# Patient Record
Sex: MALE | Race: WHITE | Employment: UNEMPLOYED | URBAN - METROPOLITAN AREA
[De-identification: names, ages, dates, MRNs, and addresses within clinical notes are randomized per-mention and may not be internally consistent; named-entity substitution may affect disease eponyms.]

---

## 2018-09-17 ENCOUNTER — OP HISTORICAL/CONVERTED ENCOUNTER (OUTPATIENT)
Dept: OTHER | Age: 58
End: 2018-09-17

## 2019-02-04 ENCOUNTER — OP HISTORICAL/CONVERTED ENCOUNTER (OUTPATIENT)
Dept: OTHER | Age: 59
End: 2019-02-04

## 2019-02-04 ENCOUNTER — ED HISTORICAL/CONVERTED ENCOUNTER (OUTPATIENT)
Dept: OTHER | Age: 59
End: 2019-02-04

## 2019-06-12 ENCOUNTER — OP HISTORICAL/CONVERTED ENCOUNTER (OUTPATIENT)
Dept: OTHER | Age: 59
End: 2019-06-12

## 2019-10-08 ENCOUNTER — OP HISTORICAL/CONVERTED ENCOUNTER (OUTPATIENT)
Dept: OTHER | Age: 59
End: 2019-10-08

## 2022-08-31 ENCOUNTER — TRANSCRIBE ORDER (OUTPATIENT)
Dept: SCHEDULING | Age: 62
End: 2022-08-31

## 2022-08-31 DIAGNOSIS — R91.8 LUNG NODULES: Primary | ICD-10-CM

## 2022-09-02 ENCOUNTER — TRANSCRIBE ORDER (OUTPATIENT)
Dept: REGISTRATION | Age: 62
End: 2022-09-02

## 2022-09-02 DIAGNOSIS — R91.8 LUNG NODULES: Primary | ICD-10-CM

## 2022-09-06 ENCOUNTER — HOSPITAL ENCOUNTER (OUTPATIENT)
Dept: CT IMAGING | Age: 62
Discharge: HOME | End: 2022-09-06
Attending: INTERNAL MEDICINE
Payer: COMMERCIAL

## 2022-09-06 ENCOUNTER — HOSPITAL ENCOUNTER (OUTPATIENT)
Dept: LAB | Age: 62
Discharge: HOME | End: 2022-09-06
Attending: INTERNAL MEDICINE
Payer: COMMERCIAL

## 2022-09-06 DIAGNOSIS — R91.8 LUNG NODULES: ICD-10-CM

## 2022-09-06 LAB — CREAT SERPL-MCNC: 0.93 MG/DL (ref 0.6–1.3)

## 2022-09-06 PROCEDURE — 74011000636 HC RX REV CODE- 636: Performed by: INTERNAL MEDICINE

## 2022-09-06 PROCEDURE — 71260 CT THORAX DX C+: CPT

## 2022-09-06 PROCEDURE — 82565 ASSAY OF CREATININE: CPT

## 2022-09-06 RX ADMIN — IOPAMIDOL 94 ML: 755 INJECTION, SOLUTION INTRAVENOUS at 13:15

## 2023-11-27 ENCOUNTER — OFFICE VISIT (OUTPATIENT)
Age: 63
End: 2023-11-27
Payer: MEDICAID

## 2023-11-27 VITALS
SYSTOLIC BLOOD PRESSURE: 146 MMHG | OXYGEN SATURATION: 97 % | RESPIRATION RATE: 16 BRPM | HEIGHT: 67 IN | WEIGHT: 142.6 LBS | HEART RATE: 98 BPM | TEMPERATURE: 97.8 F | BODY MASS INDEX: 22.38 KG/M2 | DIASTOLIC BLOOD PRESSURE: 78 MMHG

## 2023-11-27 DIAGNOSIS — D50.8 IRON DEFICIENCY ANEMIA SECONDARY TO INADEQUATE DIETARY IRON INTAKE: ICD-10-CM

## 2023-11-27 DIAGNOSIS — Z12.11 SCREENING FOR COLON CANCER: ICD-10-CM

## 2023-11-27 DIAGNOSIS — Z76.89 ENCOUNTER TO ESTABLISH CARE: Primary | ICD-10-CM

## 2023-11-27 DIAGNOSIS — Z12.5 SCREENING FOR PROSTATE CANCER: ICD-10-CM

## 2023-11-27 DIAGNOSIS — E55.9 VITAMIN D DEFICIENCY: ICD-10-CM

## 2023-11-27 DIAGNOSIS — I10 PRIMARY HYPERTENSION: ICD-10-CM

## 2023-11-27 DIAGNOSIS — K74.60 CIRRHOSIS OF LIVER WITHOUT ASCITES, UNSPECIFIED HEPATIC CIRRHOSIS TYPE (HCC): ICD-10-CM

## 2023-11-27 DIAGNOSIS — R25.1 TREMOR OF BOTH HANDS: ICD-10-CM

## 2023-11-27 DIAGNOSIS — N48.6 PEYRONIE DISEASE: ICD-10-CM

## 2023-11-27 DIAGNOSIS — E78.2 MIXED HYPERLIPIDEMIA: ICD-10-CM

## 2023-11-27 DIAGNOSIS — M15.9 OSTEOARTHRITIS OF MULTIPLE JOINTS, UNSPECIFIED OSTEOARTHRITIS TYPE: ICD-10-CM

## 2023-11-27 DIAGNOSIS — K21.9 GASTROESOPHAGEAL REFLUX DISEASE WITHOUT ESOPHAGITIS: ICD-10-CM

## 2023-11-27 DIAGNOSIS — R91.1 LUNG NODULE: ICD-10-CM

## 2023-11-27 DIAGNOSIS — R33.9 BLADDER RETENTION OF URINE: ICD-10-CM

## 2023-11-27 PROCEDURE — 99204 OFFICE O/P NEW MOD 45 MIN: CPT | Performed by: NURSE PRACTITIONER

## 2023-11-27 PROCEDURE — 3077F SYST BP >= 140 MM HG: CPT | Performed by: NURSE PRACTITIONER

## 2023-11-27 PROCEDURE — 3078F DIAST BP <80 MM HG: CPT | Performed by: NURSE PRACTITIONER

## 2023-11-27 RX ORDER — ASPIRIN 81 MG/1
81 TABLET ORAL DAILY
COMMUNITY

## 2023-11-27 RX ORDER — LISINOPRIL 2.5 MG/1
2.5 TABLET ORAL DAILY
COMMUNITY
End: 2023-11-27 | Stop reason: SDUPTHER

## 2023-11-27 RX ORDER — CELECOXIB 100 MG/1
100 CAPSULE ORAL 2 TIMES DAILY
COMMUNITY
End: 2023-11-28 | Stop reason: SDUPTHER

## 2023-11-27 RX ORDER — LISINOPRIL 2.5 MG/1
5 TABLET ORAL DAILY
Qty: 60 TABLET | Refills: 1 | Status: SHIPPED | OUTPATIENT
Start: 2023-11-27 | End: 2024-01-26

## 2023-11-27 RX ORDER — LACTULOSE 10 G/15ML
20 SOLUTION ORAL 3 TIMES DAILY
COMMUNITY
End: 2023-11-28

## 2023-11-27 RX ORDER — MULTIVIT WITH MINERALS/LUTEIN
1000 TABLET ORAL DAILY
COMMUNITY
End: 2023-11-28 | Stop reason: SDUPTHER

## 2023-11-27 RX ORDER — PNV NO.95/FERROUS FUM/FOLIC AC 28MG-0.8MG
325 TABLET ORAL DAILY
COMMUNITY
End: 2023-11-28 | Stop reason: SDUPTHER

## 2023-11-27 RX ORDER — OMEPRAZOLE 20 MG/1
20 CAPSULE, DELAYED RELEASE ORAL DAILY
COMMUNITY
End: 2023-11-28 | Stop reason: SDUPTHER

## 2023-11-27 RX ORDER — SIMVASTATIN 40 MG
40 TABLET ORAL NIGHTLY
COMMUNITY
End: 2023-11-28 | Stop reason: SDUPTHER

## 2023-11-27 RX ORDER — FUROSEMIDE 20 MG/1
20 TABLET ORAL 2 TIMES DAILY
COMMUNITY
End: 2023-11-28 | Stop reason: SDUPTHER

## 2023-11-27 RX ORDER — PROPRANOLOL HYDROCHLORIDE 40 MG/1
40 TABLET ORAL 3 TIMES DAILY
COMMUNITY
End: 2023-11-28 | Stop reason: SDUPTHER

## 2023-11-27 SDOH — ECONOMIC STABILITY: HOUSING INSECURITY
IN THE LAST 12 MONTHS, WAS THERE A TIME WHEN YOU DID NOT HAVE A STEADY PLACE TO SLEEP OR SLEPT IN A SHELTER (INCLUDING NOW)?: NO

## 2023-11-27 SDOH — ECONOMIC STABILITY: FOOD INSECURITY: WITHIN THE PAST 12 MONTHS, YOU WORRIED THAT YOUR FOOD WOULD RUN OUT BEFORE YOU GOT MONEY TO BUY MORE.: NEVER TRUE

## 2023-11-27 SDOH — ECONOMIC STABILITY: FOOD INSECURITY: WITHIN THE PAST 12 MONTHS, THE FOOD YOU BOUGHT JUST DIDN'T LAST AND YOU DIDN'T HAVE MONEY TO GET MORE.: NEVER TRUE

## 2023-11-27 SDOH — ECONOMIC STABILITY: INCOME INSECURITY: HOW HARD IS IT FOR YOU TO PAY FOR THE VERY BASICS LIKE FOOD, HOUSING, MEDICAL CARE, AND HEATING?: HARD

## 2023-11-27 ASSESSMENT — ENCOUNTER SYMPTOMS
ABDOMINAL PAIN: 0
EYE REDNESS: 0
BACK PAIN: 0
RHINORRHEA: 0
SINUS PAIN: 0
VOMITING: 0
BLOOD IN STOOL: 0
DIARRHEA: 0
SINUS PRESSURE: 0
GASTROINTESTINAL NEGATIVE: 1
EYE PAIN: 0
CONSTIPATION: 0
EYES NEGATIVE: 1
CHEST TIGHTNESS: 0
COUGH: 0
SHORTNESS OF BREATH: 0
NAUSEA: 0
RESPIRATORY NEGATIVE: 1

## 2023-11-27 ASSESSMENT — PATIENT HEALTH QUESTIONNAIRE - PHQ9
SUM OF ALL RESPONSES TO PHQ9 QUESTIONS 1 & 2: 1
SUM OF ALL RESPONSES TO PHQ QUESTIONS 1-9: 1
2. FEELING DOWN, DEPRESSED OR HOPELESS: 1
SUM OF ALL RESPONSES TO PHQ QUESTIONS 1-9: 1
1. LITTLE INTEREST OR PLEASURE IN DOING THINGS: 0

## 2023-11-27 NOTE — PROGRESS NOTES
Negative for dizziness, light-headedness and headaches. Psychiatric/Behavioral: Negative. Negative for agitation, behavioral problems, sleep disturbance and suicidal ideas. The patient is not nervous/anxious. Past Medical History   No Known Allergies     Current Outpatient Medications   Medication Sig    aspirin 81 MG EC tablet Take 1 tablet by mouth daily    celecoxib (CELEBREX) 100 MG capsule Take 1 capsule by mouth 2 times daily    Ferrous Sulfate (IRON) 325 (65 Fe) MG TABS Take 325 mg by mouth daily    furosemide (LASIX) 20 MG tablet Take 1 tablet by mouth 2 times daily    lactulose (CHRONULAC) 10 GM/15ML solution Take 30 mLs by mouth 3 times daily    omeprazole (PRILOSEC) 20 MG delayed release capsule Take 1 capsule by mouth daily    propranolol (INDERAL) 40 MG tablet Take 1 tablet by mouth 3 times daily    simvastatin (ZOCOR) 40 MG tablet Take 1 tablet by mouth nightly    vitamin E 1000 units capsule Take 1 capsule by mouth daily    lisinopril (PRINIVIL;ZESTRIL) 2.5 MG tablet Take 2 tablets by mouth daily     No current facility-administered medications for this visit. 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       Social History     Tobacco Use    Smoking status: Former     Packs/day: 0.50     Years: 26.00     Additional pack years: 0.00     Total pack years: 13.00     Types: Cigarettes     Start date: 18     Quit date: 2008     Years since quitting: 15.9     Passive exposure: Past    Smokeless tobacco: Never   Substance Use Topics    Alcohol use:

## 2023-11-28 ENCOUNTER — TELEPHONE (OUTPATIENT)
Age: 63
End: 2023-11-28

## 2023-11-28 ENCOUNTER — OFFICE VISIT (OUTPATIENT)
Age: 63
End: 2023-11-28
Payer: MEDICAID

## 2023-11-28 VITALS
DIASTOLIC BLOOD PRESSURE: 75 MMHG | HEART RATE: 104 BPM | OXYGEN SATURATION: 98 % | RESPIRATION RATE: 16 BRPM | BODY MASS INDEX: 22.68 KG/M2 | HEIGHT: 67 IN | TEMPERATURE: 97.6 F | WEIGHT: 144.5 LBS | SYSTOLIC BLOOD PRESSURE: 101 MMHG

## 2023-11-28 DIAGNOSIS — Z12.5 SCREENING FOR PROSTATE CANCER: ICD-10-CM

## 2023-11-28 DIAGNOSIS — I10 PRIMARY HYPERTENSION: ICD-10-CM

## 2023-11-28 DIAGNOSIS — R25.1 TREMOR OF BOTH HANDS: ICD-10-CM

## 2023-11-28 DIAGNOSIS — E78.2 MIXED HYPERLIPIDEMIA: ICD-10-CM

## 2023-11-28 DIAGNOSIS — Z86.19 HEPATITIS C VIRUS INFECTION CURED AFTER ANTIVIRAL DRUG THERAPY: Primary | ICD-10-CM

## 2023-11-28 DIAGNOSIS — R91.1 LUNG NODULE: ICD-10-CM

## 2023-11-28 DIAGNOSIS — D50.8 IRON DEFICIENCY ANEMIA SECONDARY TO INADEQUATE DIETARY IRON INTAKE: ICD-10-CM

## 2023-11-28 PROBLEM — K74.60 CIRRHOSIS OF LIVER WITHOUT ASCITES (HCC): Status: RESOLVED | Noted: 2023-11-27 | Resolved: 2023-11-28

## 2023-11-28 LAB
ALBUMIN SERPL-MCNC: 4.6 G/DL (ref 3.5–5)
ALBUMIN/GLOB SERPL: 1.2 (ref 1.1–2.2)
ALP SERPL-CCNC: 68 U/L (ref 45–117)
ALT SERPL-CCNC: 19 U/L (ref 12–78)
ANION GAP SERPL CALC-SCNC: 3 MMOL/L (ref 5–15)
AST SERPL-CCNC: 20 U/L (ref 15–37)
BASOPHILS # BLD: 0.1 K/UL (ref 0–0.1)
BASOPHILS NFR BLD: 1 % (ref 0–1)
BILIRUB SERPL-MCNC: 0.4 MG/DL (ref 0.2–1)
BUN SERPL-MCNC: 21 MG/DL (ref 6–20)
BUN/CREAT SERPL: 18 (ref 12–20)
CALCIUM SERPL-MCNC: 9.6 MG/DL (ref 8.5–10.1)
CHLORIDE SERPL-SCNC: 105 MMOL/L (ref 97–108)
CHOLEST SERPL-MCNC: 179 MG/DL
CO2 SERPL-SCNC: 29 MMOL/L (ref 21–32)
CREAT SERPL-MCNC: 1.18 MG/DL (ref 0.7–1.3)
CREAT UR-MCNC: 36.5 MG/DL
DIFFERENTIAL METHOD BLD: NORMAL
EOSINOPHIL # BLD: 0.1 K/UL (ref 0–0.4)
EOSINOPHIL NFR BLD: 2 % (ref 0–7)
ERYTHROCYTE [DISTWIDTH] IN BLOOD BY AUTOMATED COUNT: 13 % (ref 11.5–14.5)
FERRITIN SERPL-MCNC: 53 NG/ML (ref 26–388)
FOLATE SERPL-MCNC: 18.1 NG/ML (ref 5–21)
GLOBULIN SER CALC-MCNC: 3.9 G/DL (ref 2–4)
GLUCOSE SERPL-MCNC: 106 MG/DL (ref 65–100)
HCT VFR BLD AUTO: 42.8 % (ref 36.6–50.3)
HDLC SERPL-MCNC: 84 MG/DL
HDLC SERPL: 2.1 (ref 0–5)
HGB BLD-MCNC: 13.4 G/DL (ref 12.1–17)
IMM GRANULOCYTES # BLD AUTO: 0 K/UL (ref 0–0.04)
IMM GRANULOCYTES NFR BLD AUTO: 0 % (ref 0–0.5)
IRON SATN MFR SERPL: 11 % (ref 20–50)
IRON SERPL-MCNC: 45 UG/DL (ref 35–150)
LDLC SERPL CALC-MCNC: 78.2 MG/DL (ref 0–100)
LYMPHOCYTES # BLD: 1.3 K/UL (ref 0.8–3.5)
LYMPHOCYTES NFR BLD: 17 % (ref 12–49)
MCH RBC QN AUTO: 26.7 PG (ref 26–34)
MCHC RBC AUTO-ENTMCNC: 31.3 G/DL (ref 30–36.5)
MCV RBC AUTO: 85.4 FL (ref 80–99)
MICROALBUMIN UR-MCNC: 2.47 MG/DL
MICROALBUMIN/CREAT UR-RTO: 68 MG/G (ref 0–30)
MONOCYTES # BLD: 0.6 K/UL (ref 0–1)
MONOCYTES NFR BLD: 7 % (ref 5–13)
NEUTS SEG # BLD: 5.4 K/UL (ref 1.8–8)
NEUTS SEG NFR BLD: 73 % (ref 32–75)
NRBC # BLD: 0 K/UL (ref 0–0.01)
NRBC BLD-RTO: 0 PER 100 WBC
PLATELET # BLD AUTO: 201 K/UL (ref 150–400)
PMV BLD AUTO: 12.9 FL (ref 8.9–12.9)
POTASSIUM SERPL-SCNC: 4.3 MMOL/L (ref 3.5–5.1)
PROT SERPL-MCNC: 8.5 G/DL (ref 6.4–8.2)
PSA SERPL-MCNC: 2 NG/ML (ref 0.01–4)
RBC # BLD AUTO: 5.01 M/UL (ref 4.1–5.7)
SODIUM SERPL-SCNC: 137 MMOL/L (ref 136–145)
TIBC SERPL-MCNC: 425 UG/DL (ref 250–450)
TRIGL SERPL-MCNC: 84 MG/DL
TSH SERPL DL<=0.05 MIU/L-ACNC: 0.97 UIU/ML (ref 0.36–3.74)
VIT B12 SERPL-MCNC: 662 PG/ML (ref 193–986)
VLDLC SERPL CALC-MCNC: 16.8 MG/DL
WBC # BLD AUTO: 7.4 K/UL (ref 4.1–11.1)

## 2023-11-28 PROCEDURE — 99204 OFFICE O/P NEW MOD 45 MIN: CPT | Performed by: NURSE PRACTITIONER

## 2023-11-28 PROCEDURE — 3078F DIAST BP <80 MM HG: CPT | Performed by: NURSE PRACTITIONER

## 2023-11-28 PROCEDURE — 3074F SYST BP LT 130 MM HG: CPT | Performed by: NURSE PRACTITIONER

## 2023-11-28 RX ORDER — CELECOXIB 100 MG/1
100 CAPSULE ORAL 2 TIMES DAILY
Qty: 60 CAPSULE | Refills: 0 | Status: SHIPPED | OUTPATIENT
Start: 2023-11-28

## 2023-11-28 RX ORDER — SIMVASTATIN 40 MG
40 TABLET ORAL NIGHTLY
Qty: 30 TABLET | Refills: 0 | Status: SHIPPED | OUTPATIENT
Start: 2023-11-28

## 2023-11-28 RX ORDER — FUROSEMIDE 20 MG/1
20 TABLET ORAL 2 TIMES DAILY
Qty: 60 TABLET | Refills: 0 | Status: SHIPPED | OUTPATIENT
Start: 2023-11-28

## 2023-11-28 RX ORDER — MULTIVIT WITH MINERALS/LUTEIN
1000 TABLET ORAL DAILY
Qty: 30 CAPSULE | Refills: 0 | Status: SHIPPED | OUTPATIENT
Start: 2023-11-28

## 2023-11-28 RX ORDER — OMEPRAZOLE 20 MG/1
20 CAPSULE, DELAYED RELEASE ORAL DAILY
Qty: 30 CAPSULE | Refills: 0 | Status: SHIPPED | OUTPATIENT
Start: 2023-11-28

## 2023-11-28 RX ORDER — PNV NO.95/FERROUS FUM/FOLIC AC 28MG-0.8MG
325 TABLET ORAL DAILY
Qty: 30 TABLET | Refills: 0 | Status: SHIPPED | OUTPATIENT
Start: 2023-11-28

## 2023-11-28 RX ORDER — PROPRANOLOL HYDROCHLORIDE 40 MG/1
40 TABLET ORAL 3 TIMES DAILY
Qty: 90 TABLET | Refills: 0 | Status: SHIPPED | OUTPATIENT
Start: 2023-11-28

## 2023-11-28 NOTE — TELEPHONE ENCOUNTER
Medication refill for 8 prescriptions    aspirin 81 MG EC tablet  celecoxib (CELEBREX) 100 MG capsule   Ferrous Sulfate (IRON) 325 (65 Fe) MG TABS  furosemide (LASIX) 20 MG tablet  omeprazole (PRILOSEC) 20 MG delayed release capsule   propranolol (INDERAL) 40 MG tablet  simvastatin (ZOCOR) 40 MG tablet  vitamin E 1000 units capsule    Patient wants to know how to also get catheters and pull ups, he uses 3 to 4 catheters a day    Call Maureen Blevins [phone]    Send to  [facility] [phone] - F [phone]

## 2023-11-28 NOTE — TELEPHONE ENCOUNTER
Sent in meds again and also patient is to contact insurance and call our office with preferred DME company to use.

## 2023-11-29 ENCOUNTER — TELEPHONE (OUTPATIENT)
Age: 63
End: 2023-11-29

## 2023-11-29 LAB
HBV CORE AB SERPL QL IA: NEGATIVE
HBV SURFACE AB SER QL: NONREACTIVE
HBV SURFACE AB SER-ACNC: <3.1 MIU/ML
HBV SURFACE AG SER QL: 0.4 INDEX
HBV SURFACE AG SER QL: NEGATIVE

## 2023-11-29 NOTE — TELEPHONE ENCOUNTER
----- Message from Jacob Rodriguez sent at 11/29/2023  9:14 AM EST -----  Subject: Message to Provider    QUESTIONS  Information for Provider? pt calling to give information for [employer]. he found some where that excepts his insurance   ---------------------------------------------------------------------------  --------------  [address]  [phone]; OK to leave message on voicemail  ---------------------------------------------------------------------------  --------------  SCRIPT ANSWERS  Relationship to Patient?  Self

## 2023-11-30 LAB
HAV AB SER QL IA: POSITIVE
HCV GENTYP SERPL NAA+PROBE: NORMAL
HCV RNA SERPL NAA+PROBE-ACNC: NORMAL IU/ML
HCV RNA SERPL NAA+PROBE-LOG IU: NORMAL LOG10 IU/ML
LABORATORY COMMENT REPORT: NORMAL

## 2023-12-08 ENCOUNTER — TELEPHONE (OUTPATIENT)
Age: 63
End: 2023-12-08

## 2023-12-08 NOTE — TELEPHONE ENCOUNTER
Sarkis is calling from [address] [place] [address] and he is requesting face to face office notes. To go with the prescription that was sent over  F:[phone]  P:[phone]

## 2023-12-14 ENCOUNTER — TELEPHONE (OUTPATIENT)
Age: 63
End: 2023-12-14

## 2023-12-14 NOTE — TELEPHONE ENCOUNTER
Nurse called patient and advised that Home care delivery will send us verification form to fill and send back. Form not received awaiting form to come through fax.

## 2023-12-14 NOTE — TELEPHONE ENCOUNTER
Patient stated home care delivery needs verification of incontinence so his pulls up will be approved through insurance, he also says he needs to qualify for a BP reading device so he can keep track of his BP readings.     Home Care Delivery  [address] [address] [address]  P:[phone]    Kenneth [phone]

## 2023-12-19 ENCOUNTER — TELEPHONE (OUTPATIENT)
Age: 63
End: 2023-12-19

## 2023-12-19 NOTE — TELEPHONE ENCOUNTER
The patient is requesting a call back to discuss a new referral for a CT scan. He stated that the first appointment was cancel due to his insurance not covering that specific doctor.   [phone]

## 2023-12-21 PROBLEM — Z86.718 HISTORY OF MATERNAL DEEP VEIN THROMBOSIS (DVT): Status: ACTIVE | Noted: 2023-12-21

## 2023-12-21 PROBLEM — Z87.59 HISTORY OF MATERNAL DEEP VEIN THROMBOSIS (DVT): Status: ACTIVE | Noted: 2023-12-21

## 2023-12-28 ENCOUNTER — TELEPHONE (OUTPATIENT)
Age: 63
End: 2023-12-28

## 2023-12-28 ENCOUNTER — HOME HEALTH ADMISSION (OUTPATIENT)
Dept: HOME HEALTH SERVICES | Facility: HOME HEALTH | Age: 63
End: 2023-12-28
Payer: MEDICAID

## 2023-12-28 DIAGNOSIS — R33.9 BLADDER RETENTION OF URINE: ICD-10-CM

## 2023-12-28 DIAGNOSIS — R53.1 GENERALIZED WEAKNESS: ICD-10-CM

## 2023-12-28 DIAGNOSIS — Z79.899 MEDICATION MANAGEMENT: ICD-10-CM

## 2023-12-28 DIAGNOSIS — M15.9 OSTEOARTHRITIS OF MULTIPLE JOINTS, UNSPECIFIED OSTEOARTHRITIS TYPE: ICD-10-CM

## 2023-12-28 DIAGNOSIS — R26.81 GAIT INSTABILITY: ICD-10-CM

## 2023-12-28 DIAGNOSIS — R25.1 TREMOR OF BOTH HANDS: ICD-10-CM

## 2023-12-28 DIAGNOSIS — Z78.9 DECREASED INDEPENDENCE WITH ACTIVITIES OF DAILY LIVING: ICD-10-CM

## 2023-12-28 DIAGNOSIS — I10 PRIMARY HYPERTENSION: Primary | ICD-10-CM

## 2023-12-28 DIAGNOSIS — N48.6 PEYRONIE DISEASE: ICD-10-CM

## 2023-12-28 NOTE — TELEPHONE ENCOUNTER
----- Message from Jona Dawn sent at 12/28/2023  9:38 AM EST -----  Subject: Message to Provider    QUESTIONS  Information for Provider? Pt is calling in regarding home healthcare. Pt   discussed this with the provider on 12/21/2023. Pt is unable to take care   of himself, like bathing, getting up to eat. Pt would like the order   placed and sent to his insurance company as soon as possible. Pt would   like a call back. ---------------------------------------------------------------------------  --------------  Di COKER  [phone]; OK to leave message on voicemail  ---------------------------------------------------------------------------  --------------  SCRIPT ANSWERS  Relationship to Patient?  Self

## 2023-12-28 NOTE — TELEPHONE ENCOUNTER
----- Message from Aaron Mullins sent at 12/28/2023 11:34 AM EST -----  Subject: Message to Provider    QUESTIONS  Information for Provider? Peter from [employer] called to discuss   a prior auth for home health care for the pt that they haven't received   yet. Please submit through their portal or call back. They said this needs   to be expedited   ---------------------------------------------------------------------------  --------------  CALL BACK INFO  [phone]; Do not leave any message, patient will call back for answer  ---------------------------------------------------------------------------  --------------  SCRIPT ANSWERS  Relationship to Patient? Covered Entity  Covered Entity Type? Health Insurance? Representative Name?  Tuan Hoskins

## 2023-12-28 NOTE — TELEPHONE ENCOUNTER
Nurse spoke with PCP to confirm patient's Home Health needs. Referral sent electronically via [employer] to [address] [place] [address]. Patient verbalized to PCP that he is in need of Home Health evaluation.

## 2023-12-29 ENCOUNTER — HOME CARE VISIT (OUTPATIENT)
Facility: HOME HEALTH | Age: 63
End: 2023-12-29
Payer: MEDICAID

## 2023-12-29 ENCOUNTER — TELEPHONE (OUTPATIENT)
Age: 63
End: 2023-12-29

## 2023-12-29 VITALS
HEART RATE: 68 BPM | TEMPERATURE: 99.7 F | SYSTOLIC BLOOD PRESSURE: 132 MMHG | RESPIRATION RATE: 16 BRPM | DIASTOLIC BLOOD PRESSURE: 60 MMHG | OXYGEN SATURATION: 98 %

## 2023-12-29 PROCEDURE — G0299 HHS/HOSPICE OF RN EA 15 MIN: HCPCS

## 2023-12-29 NOTE — TELEPHONE ENCOUNTER
The patient stated that the pharmacy said the medications below needs approval. He will like a call back to confirm if medication has been approved. He will like for the Rx refill to sent to the pharmacy below [phone]  (he will be completley out by tomorrow)    simvastatin (ZOCOR) 40 MG tablet   propranolol (INDERAL) 40 MG tablet   furosemide (LASIX) 20 MG tablet   omeprazole (PRILOSEC) 20 MG delayed release capsule       [facility] [place] [facility] [address] [place] [address] -  [phone] - F [phone]

## 2023-12-29 NOTE — HOME HEALTH
Reason for referral, PMH SUMMARY of clinical condition: 80year old female patient of Remi Block, APRN - CNP;  admitted to home care for skilled nursing for cardiopulmonary assessment, genitourinary assessment and diet and lifestyle teaching associated with polyosteoarthritis. PMH of Peyronie's diease, where patient self catheterizes 3-4 times daily with a VaPro Plus Pocket catheter and patient admits to already having 3 surgeries to correct the issue. Patient reports incontinence and pain inbetween catherizations. Also with history of DVT in bilateral lower extremites, HLD, HTN, GERD, ANEMIA, RIGHT LUNG NODULE, ostearthritis of multiple joints, Patient lives alone in his parents tri-level home with bedroom on second level and ramp at front of home. Parents passed away a few months ago leaving him the house and possessions. Patient was recently released home on probation on 10/30/23 after spending 22 years in custodial. Patient is a registered sex offender. Reports spending last years at [facility] which is an assisted living custodial for disabled, elderly and sick inmates where he reports having 24 hour. Patient ambulates with a walker and has a new wheelchair but unable to wheel self in home due to carpet. Patient complains of chronic pain to hips, groin and hands making it hard to bathe, dress, straight cath himself and feed himself at times. Patient is a high fall risk and admits to multiple falls. No caregiver available to assist patient. Brother Tnaika Grove and son Ilya Abreu lives in [place] but work FT are unavailable to assist but Tanika Grove can help sometimes with transportation and bringing meals. Patient daughter lives in [place] but has autism and does not drive but did assist patient with a bath last week and can assist with housekeeping. Patient reports he is looking for a caregiver to help him daily with meals, bathing and dressing and needs help with community resources.  Reports that

## 2023-12-30 ENCOUNTER — HOME CARE VISIT (OUTPATIENT)
Dept: HOME HEALTH SERVICES | Facility: HOME HEALTH | Age: 63
End: 2023-12-30
Payer: MEDICAID

## 2024-01-02 ENCOUNTER — TELEPHONE (OUTPATIENT)
Age: 64
End: 2024-01-02

## 2024-01-02 ENCOUNTER — HOME CARE VISIT (OUTPATIENT)
Dept: HOME HEALTH SERVICES | Facility: HOME HEALTH | Age: 64
End: 2024-01-02
Payer: MEDICAID

## 2024-01-02 ENCOUNTER — OFFICE VISIT (OUTPATIENT)
Age: 64
End: 2024-01-02
Payer: MEDICAID

## 2024-01-02 VITALS
OXYGEN SATURATION: 98 % | DIASTOLIC BLOOD PRESSURE: 68 MMHG | HEIGHT: 67 IN | WEIGHT: 137 LBS | TEMPERATURE: 97.7 F | SYSTOLIC BLOOD PRESSURE: 122 MMHG | HEART RATE: 86 BPM | BODY MASS INDEX: 21.5 KG/M2 | RESPIRATION RATE: 16 BRPM

## 2024-01-02 DIAGNOSIS — Z00.00 ANNUAL PHYSICAL EXAM: Primary | ICD-10-CM

## 2024-01-02 DIAGNOSIS — R25.1 TREMOR OF BOTH HANDS: ICD-10-CM

## 2024-01-02 DIAGNOSIS — Z91.81 AT RISK FOR FALLS: ICD-10-CM

## 2024-01-02 DIAGNOSIS — F32.9 REACTIVE DEPRESSION: ICD-10-CM

## 2024-01-02 DIAGNOSIS — E78.2 MIXED HYPERLIPIDEMIA: ICD-10-CM

## 2024-01-02 DIAGNOSIS — R91.1 LUNG NODULE: ICD-10-CM

## 2024-01-02 DIAGNOSIS — I10 PRIMARY HYPERTENSION: ICD-10-CM

## 2024-01-02 DIAGNOSIS — N48.6 PEYRONIE DISEASE: ICD-10-CM

## 2024-01-02 DIAGNOSIS — Z12.11 SCREENING FOR COLON CANCER: ICD-10-CM

## 2024-01-02 DIAGNOSIS — M15.9 OSTEOARTHRITIS OF MULTIPLE JOINTS, UNSPECIFIED OSTEOARTHRITIS TYPE: ICD-10-CM

## 2024-01-02 DIAGNOSIS — D50.8 IRON DEFICIENCY ANEMIA SECONDARY TO INADEQUATE DIETARY IRON INTAKE: ICD-10-CM

## 2024-01-02 PROCEDURE — 99396 PREV VISIT EST AGE 40-64: CPT | Performed by: NURSE PRACTITIONER

## 2024-01-02 PROCEDURE — 3078F DIAST BP <80 MM HG: CPT | Performed by: NURSE PRACTITIONER

## 2024-01-02 PROCEDURE — 3074F SYST BP LT 130 MM HG: CPT | Performed by: NURSE PRACTITIONER

## 2024-01-02 RX ORDER — FUROSEMIDE 20 MG/1
20 TABLET ORAL 2 TIMES DAILY
Qty: 60 TABLET | Refills: 0 | Status: SHIPPED | OUTPATIENT
Start: 2024-01-02

## 2024-01-02 RX ORDER — PROPRANOLOL HYDROCHLORIDE 40 MG/1
40 TABLET ORAL 3 TIMES DAILY
Qty: 90 TABLET | Refills: 0 | Status: SHIPPED | OUTPATIENT
Start: 2024-01-02

## 2024-01-02 RX ORDER — MULTIVIT WITH MINERALS/LUTEIN
1000 TABLET ORAL DAILY
Qty: 30 CAPSULE | Refills: 0 | Status: SHIPPED | OUTPATIENT
Start: 2024-01-02 | End: 2024-02-01

## 2024-01-02 RX ORDER — DULOXETIN HYDROCHLORIDE 30 MG/1
30 CAPSULE, DELAYED RELEASE ORAL DAILY
Qty: 90 CAPSULE | Refills: 1 | Status: SHIPPED | OUTPATIENT
Start: 2024-01-02

## 2024-01-02 RX ORDER — PNV NO.95/FERROUS FUM/FOLIC AC 28MG-0.8MG
325 TABLET ORAL DAILY
Qty: 30 TABLET | Refills: 0 | Status: SHIPPED | OUTPATIENT
Start: 2024-01-02

## 2024-01-02 RX ORDER — SIMVASTATIN 40 MG
40 TABLET ORAL NIGHTLY
Qty: 30 TABLET | Refills: 0 | Status: SHIPPED | OUTPATIENT
Start: 2024-01-02

## 2024-01-02 RX ORDER — OMEPRAZOLE 20 MG/1
20 CAPSULE, DELAYED RELEASE ORAL DAILY
Qty: 30 CAPSULE | Refills: 0 | Status: SHIPPED | OUTPATIENT
Start: 2024-01-02

## 2024-01-02 ASSESSMENT — PATIENT HEALTH QUESTIONNAIRE - PHQ9
SUM OF ALL RESPONSES TO PHQ QUESTIONS 1-9: 7
7. TROUBLE CONCENTRATING ON THINGS, SUCH AS READING THE NEWSPAPER OR WATCHING TELEVISION: 0
1. LITTLE INTEREST OR PLEASURE IN DOING THINGS: 2
6. FEELING BAD ABOUT YOURSELF - OR THAT YOU ARE A FAILURE OR HAVE LET YOURSELF OR YOUR FAMILY DOWN: 0
5. POOR APPETITE OR OVEREATING: 0
8. MOVING OR SPEAKING SO SLOWLY THAT OTHER PEOPLE COULD HAVE NOTICED. OR THE OPPOSITE, BEING SO FIGETY OR RESTLESS THAT YOU HAVE BEEN MOVING AROUND A LOT MORE THAN USUAL: 0
4. FEELING TIRED OR HAVING LITTLE ENERGY: 2
3. TROUBLE FALLING OR STAYING ASLEEP: 2
10. IF YOU CHECKED OFF ANY PROBLEMS, HOW DIFFICULT HAVE THESE PROBLEMS MADE IT FOR YOU TO DO YOUR WORK, TAKE CARE OF THINGS AT HOME, OR GET ALONG WITH OTHER PEOPLE: 1
SUM OF ALL RESPONSES TO PHQ9 QUESTIONS 1 & 2: 3
SUM OF ALL RESPONSES TO PHQ QUESTIONS 1-9: 7
2. FEELING DOWN, DEPRESSED OR HOPELESS: 1
9. THOUGHTS THAT YOU WOULD BE BETTER OFF DEAD, OR OF HURTING YOURSELF: 0
SUM OF ALL RESPONSES TO PHQ QUESTIONS 1-9: 7
SUM OF ALL RESPONSES TO PHQ QUESTIONS 1-9: 7

## 2024-01-02 NOTE — TELEPHONE ENCOUNTER
----- Message from Salome Lutz sent at 1/2/2024 10:13 AM EST -----  Subject: Message to Provider    QUESTIONS  Information for Provider? Patient calling due to issues of home healthcare   being canceled due to crime and being let out of care home. Please call to   assist patient in finding home healthcare to support his needs.  ---------------------------------------------------------------------------  --------------  CALL BACK INFO  [phone]; OK to leave message on voicemail  ---------------------------------------------------------------------------  --------------  SCRIPT ANSWERS  Relationship to Patient? Self

## 2024-01-02 NOTE — PROGRESS NOTES
hypertension: Stable. Continue wsith Lisinopril and Lasix.   - furosemide (LASIX) 20 MG tablet; Take 1 tablet by mouth 2 times daily  Dispense: 60 tablet; Refill: 0    3. Mixed hyperlipidemia: Continue with Zocor.   - omeprazole (PRILOSEC) 20 MG delayed release capsule; Take 1 capsule by mouth daily  Dispense: 30 capsule; Refill: 0  - simvastatin (ZOCOR) 40 MG tablet; Take 1 tablet by mouth nightly  Dispense: 30 tablet; Refill: 0    4. Tremor of both hands: Stable. Pt would like to be referred to neurologist to explore alternative treatment options. Continue with Propranolol.  - propranolol (INDERAL) 40 MG tablet; Take 1 tablet by mouth 3 times daily  Dispense: 90 tablet; Refill: 0  - [facility] - Sherman Pennington MD, Neurology, [place]    5. Reactive depression: Emotional support provided. Discussed ways to manage life outside prison, managing pain to a tolerable level insuring quality of life. Will start treatment with Duloxetine as it can also aid to improve chronic pain. Crisis plan discussed. Return instructions given given. Pt verbalized understanding.   - DULoxetine (CYMBALTA) 30 MG extended release capsule; Take 1 capsule by mouth daily  Dispense: 90 capsule; Refill: 1    6. Peyronie disease: Continue to self cath, awaiting for Home Health visit. Has appt with urologist later this week.   - vitamin E 1000 units capsule; Take 1 capsule by mouth daily  Dispense: 30 capsule; Refill: 0    7. Iron deficiency anemia secondary to inadequate dietary iron intake: Continue with daily iron supplements.   - Ferrous Sulfate (IRON) 325 (65 Fe) MG TABS; Take 325 mg by mouth daily  Dispense: 30 tablet; Refill: 0    8. Osteoarthritis of multiple joints, unspecified osteoarthritis type: Some improvement with recent Medrol treatment, discussed starting low dose Prednisone, pt does not desire treatment. We will start Duloxetine. Continue with Celebrex and Tylenol for pain as needed. Continue with non-pharmaceutical treatment for

## 2024-01-03 ENCOUNTER — TELEPHONE (OUTPATIENT)
Age: 64
End: 2024-01-03

## 2024-01-03 NOTE — TELEPHONE ENCOUNTER
The patient is requesting a call back to see what going on with his incontinent supplies. He stated that home care delivery needed Confirmation from the doctor office to inform its a medically necessity. (he need verification )They fax over information he will like a confirmation if they fax it over or not  [phone]    P: home care delivered [phone]

## 2024-01-03 NOTE — TELEPHONE ENCOUNTER
Mr. Nina stopped by to drop of documents for Kateryna. Stated she asked him to drop of for her at the

## 2024-01-04 ENCOUNTER — TELEPHONE (OUTPATIENT)
Age: 64
End: 2024-01-04

## 2024-01-04 NOTE — TELEPHONE ENCOUNTER
Olivia called from [employer]. She stated that she Need more information about the patient and also visit notes. She will need more clarification and the Company has a new name ([email or web address] not [employer]). She also mention if the patient has  [facility] they don't except.    P:[phone]  Fax:[phone]

## 2024-01-05 ENCOUNTER — TELEPHONE (OUTPATIENT)
Age: 64
End: 2024-01-05

## 2024-01-05 DIAGNOSIS — R91.1 LUNG NODULE: Primary | ICD-10-CM

## 2024-01-05 NOTE — TELEPHONE ENCOUNTER
Salome from [facility] was calling to request a new order for CT of chest: this order needs to say either with or without contrast it cannot state both   She also needs a pre authorization through patients insurance if we have it    We can fax this to: [phone]

## 2024-01-10 ENCOUNTER — TELEPHONE (OUTPATIENT)
Age: 64
End: 2024-01-10

## 2024-01-10 NOTE — TELEPHONE ENCOUNTER
Sp[facility] with patient [facility]. Patient also checking on status of HomeHealth. Patient thankful for call.Patient has tried calling  for direction of Home health and I will keep trying resources we can provide to help with Home Health.

## 2024-01-10 NOTE — TELEPHONE ENCOUNTER
----- Message from Eliaz Tompkins sent at 1/9/2024  3:50 PM EST -----  Subject: Message to Provider    QUESTIONS  Information for Provider? Patient called back to give fax # to send   records request to, Fax #[phone] [facility]   Records. Needs everything except dental and mental. Please contact patient   once sent.  ---------------------------------------------------------------------------  --------------  CALL BACK INFO  [phone]; OK to leave message on voicemail  ---------------------------------------------------------------------------  --------------  SCRIPT ANSWERS  Relationship to Patient? Self

## 2024-01-11 ENCOUNTER — TELEPHONE (OUTPATIENT)
Age: 64
End: 2024-01-11

## 2024-01-11 NOTE — TELEPHONE ENCOUNTER
FYI: Patient was calling to inform nurse that patient has gotten someone from  to come out for home care screening to evaluate him   Patient is VERY THANKFUL for nurse \"sticking her neeck out\" to help him

## 2024-01-17 ENCOUNTER — TELEPHONE (OUTPATIENT)
Age: 64
End: 2024-01-17

## 2024-01-17 NOTE — TELEPHONE ENCOUNTER
Nurse returned call. Nurse advised patient to call his insurance to see which rheumatologist accepts his insurance. Patient was thankful for the call back.

## 2024-01-17 NOTE — TELEPHONE ENCOUNTER
Aida called from [facility] and stated that she called to deliver a message for the patient . She stated that the patient said he had already got a CT chest scan without contrast done at  [facility] on 01/08/2024 so therefore their office don't need to do the CT scan. Also he will need a new referral to a rheumatologist that will accept him.     Kenneth [phone]

## 2024-01-25 DIAGNOSIS — N48.6 PEYRONIE DISEASE: ICD-10-CM

## 2024-01-25 DIAGNOSIS — M15.9 OSTEOARTHRITIS OF MULTIPLE JOINTS, UNSPECIFIED OSTEOARTHRITIS TYPE: ICD-10-CM

## 2024-01-25 DIAGNOSIS — D50.8 IRON DEFICIENCY ANEMIA SECONDARY TO INADEQUATE DIETARY IRON INTAKE: ICD-10-CM

## 2024-01-25 DIAGNOSIS — E78.2 MIXED HYPERLIPIDEMIA: ICD-10-CM

## 2024-01-25 DIAGNOSIS — I10 PRIMARY HYPERTENSION: ICD-10-CM

## 2024-01-25 DIAGNOSIS — R25.1 TREMOR OF BOTH HANDS: ICD-10-CM

## 2024-01-25 DIAGNOSIS — F32.9 REACTIVE DEPRESSION: ICD-10-CM

## 2024-01-25 RX ORDER — FUROSEMIDE 20 MG/1
20 TABLET ORAL 2 TIMES DAILY
Qty: 60 TABLET | Refills: 0 | Status: SHIPPED | OUTPATIENT
Start: 2024-01-25

## 2024-01-25 RX ORDER — LISINOPRIL 2.5 MG/1
5 TABLET ORAL DAILY
Qty: 60 TABLET | Refills: 1 | Status: SHIPPED | OUTPATIENT
Start: 2024-01-25 | End: 2024-03-25

## 2024-01-25 RX ORDER — CELECOXIB 100 MG/1
100 CAPSULE ORAL 2 TIMES DAILY
Qty: 60 CAPSULE | Refills: 0 | Status: SHIPPED | OUTPATIENT
Start: 2024-01-25 | End: 2024-02-24

## 2024-01-25 RX ORDER — SENNOSIDES 8.6 MG
650 CAPSULE ORAL 2 TIMES DAILY
Qty: 180 TABLET | Refills: 1 | Status: SHIPPED | OUTPATIENT
Start: 2024-01-25 | End: 2024-07-23

## 2024-01-25 RX ORDER — ASPIRIN 81 MG/1
81 TABLET ORAL DAILY
Qty: 90 TABLET | Refills: 1 | Status: SHIPPED | OUTPATIENT
Start: 2024-01-25 | End: 2024-07-23

## 2024-01-25 RX ORDER — SIMVASTATIN 40 MG
40 TABLET ORAL NIGHTLY
Qty: 30 TABLET | Refills: 0 | Status: SHIPPED | OUTPATIENT
Start: 2024-01-25

## 2024-01-25 RX ORDER — PNV NO.95/FERROUS FUM/FOLIC AC 28MG-0.8MG
325 TABLET ORAL DAILY
Qty: 30 TABLET | Refills: 0 | Status: SHIPPED | OUTPATIENT
Start: 2024-01-25

## 2024-01-25 RX ORDER — PROPRANOLOL HYDROCHLORIDE 40 MG/1
40 TABLET ORAL 3 TIMES DAILY
Qty: 90 TABLET | Refills: 0 | Status: SHIPPED | OUTPATIENT
Start: 2024-01-25

## 2024-01-25 RX ORDER — MULTIVIT WITH MINERALS/LUTEIN
1000 TABLET ORAL DAILY
Qty: 30 CAPSULE | Refills: 0 | Status: SHIPPED | OUTPATIENT
Start: 2024-01-25 | End: 2024-02-24

## 2024-01-25 RX ORDER — DULOXETIN HYDROCHLORIDE 30 MG/1
30 CAPSULE, DELAYED RELEASE ORAL DAILY
Qty: 90 CAPSULE | Refills: 1 | Status: SHIPPED | OUTPATIENT
Start: 2024-01-25

## 2024-01-25 RX ORDER — OMEPRAZOLE 20 MG/1
20 CAPSULE, DELAYED RELEASE ORAL DAILY
Qty: 30 CAPSULE | Refills: 0 | Status: SHIPPED | OUTPATIENT
Start: 2024-01-25

## 2024-01-30 ENCOUNTER — TELEPHONE (OUTPATIENT)
Age: 64
End: 2024-01-30

## 2024-01-30 DIAGNOSIS — I10 PRIMARY HYPERTENSION: ICD-10-CM

## 2024-01-30 RX ORDER — FUROSEMIDE 20 MG/1
20 TABLET ORAL 2 TIMES DAILY
Qty: 60 TABLET | Refills: 0 | Status: SHIPPED | OUTPATIENT
Start: 2024-01-30

## 2024-01-30 NOTE — TELEPHONE ENCOUNTER
Medication refill     furosemide (LASIX) 20 MG tablet    [facility] [place] [facility] [address] [facility] -  [phone] - F [phone]

## 2024-02-06 ENCOUNTER — TELEPHONE (OUTPATIENT)
Age: 64
End: 2024-02-06

## 2024-02-06 DIAGNOSIS — D50.8 IRON DEFICIENCY ANEMIA SECONDARY TO INADEQUATE DIETARY IRON INTAKE: Primary | ICD-10-CM

## 2024-02-06 NOTE — TELEPHONE ENCOUNTER
----- Message from Aracely Mccoy MA sent at 2/1/2024 10:04 AM EST -----  Subject: Message to Provider    QUESTIONS  Information for Provider? Pt called and stated that he is trying to get a   prior authorization for an upright walker. Pt states that he would like   for Nurse Kateryna to give him a call back to give further details. Please   call the pt back.   ---------------------------------------------------------------------------  --------------  CALL BACK INFO  [phone]; OK to leave message on voicemail  ---------------------------------------------------------------------------  --------------  SCRIPT ANSWERS  Relationship to Patient? Self

## 2024-02-06 NOTE — TELEPHONE ENCOUNTER
Serenity Baker [phone]. Patient states [facility] care equipment.

## 2024-02-06 NOTE — TELEPHONE ENCOUNTER
----- Message from Aracely Mccoy MA sent at 2/1/2024 10:04 AM EST -----  Subject: Message to Provider    QUESTIONS  Information for Provider? Pt called and stated that he is trying to get a   prior authorization for an upright walker. Pt states that he would like   for Nurse Kateryna to give him a call back to give further details. Please   call the pt back.   ---------------------------------------------------------------------------  --------------  CALL BACK INFO  [phone]; OK to leave message on voicemail  ---------------------------------------------------------------------------  --------------  SCRIPT ANSWERS  Relationship to Patient? Self

## 2024-02-06 NOTE — TELEPHONE ENCOUNTER
----- Message from Aracely Mccoy MA sent at 2/1/2024 10:04 AM EST -----  Subject: Message to Provider    QUESTIONS  Information for Provider? Pt called and stated that he is trying to get a   prior authorization for an upright walker. Pt states that he would like   for Nurse Kateryna to give him a call back to give further details. Please   call the pt back.   ---------------------------------------------------------------------------  --------------  CALL BACK INFO  [phone]; OK to leave message on voicemail  ---------------------------------------------------------------------------  --------------  SCRIPT ANSWERS  Relationship to Patient? Self

## 2024-02-16 ENCOUNTER — TELEPHONE (OUTPATIENT)
Age: 64
End: 2024-02-16

## 2024-02-16 NOTE — TELEPHONE ENCOUNTER
Spoke with patient calling and checking on Rolator or walker with basket for patient to help move around. Left voice message for Serenity Baker who is helping with patients case.Patient was thankful for the call.

## 2024-02-19 ENCOUNTER — TELEPHONE (OUTPATIENT)
Age: 64
End: 2024-02-19

## 2024-02-19 NOTE — TELEPHONE ENCOUNTER
----- Message from J Luis Mina sent at 2/19/2024 11:55 AM EST -----  Subject: Message to Provider    QUESTIONS  Information for Provider? Patient was calling to speak with the nurse he   said it is about his referral for Rheumatology he said it was denied   because it needed more information on the referral. Patient would like a   call back to speak with nurse Avendaño.  ---------------------------------------------------------------------------  --------------  CALL BACK INFO  [phone]; OK to leave message on voicemail  ---------------------------------------------------------------------------  --------------  SCRIPT ANSWERS  Relationship to Patient? Self

## 2024-02-19 NOTE — TELEPHONE ENCOUNTER
Serenity from [facility] is requesting a call back regarding a Rolator order for the patient.  [phone]

## 2024-02-22 ENCOUNTER — TELEPHONE (OUTPATIENT)
Age: 64
End: 2024-02-22

## 2024-02-22 NOTE — TELEPHONE ENCOUNTER
Spoke with patient and will let office know if he does not hear from DME company. I have faxed over paperwork again today. Patient thankful for the call.

## 2024-02-22 NOTE — TELEPHONE ENCOUNTER
Patient called stating that he wanted to let the nurse know that he hasn't heard from anyone regarding the Rolator.     Call Kenneth [phone]

## 2024-02-26 DIAGNOSIS — M15.9 OSTEOARTHRITIS OF MULTIPLE JOINTS, UNSPECIFIED OSTEOARTHRITIS TYPE: ICD-10-CM

## 2024-02-26 DIAGNOSIS — N48.6 PEYRONIE DISEASE: ICD-10-CM

## 2024-02-26 DIAGNOSIS — E78.2 MIXED HYPERLIPIDEMIA: ICD-10-CM

## 2024-02-26 DIAGNOSIS — R25.1 TREMOR OF BOTH HANDS: ICD-10-CM

## 2024-02-26 DIAGNOSIS — I10 PRIMARY HYPERTENSION: ICD-10-CM

## 2024-02-26 DIAGNOSIS — F32.9 REACTIVE DEPRESSION: ICD-10-CM

## 2024-02-26 DIAGNOSIS — D50.8 IRON DEFICIENCY ANEMIA SECONDARY TO INADEQUATE DIETARY IRON INTAKE: ICD-10-CM

## 2024-02-26 RX ORDER — DULOXETIN HYDROCHLORIDE 30 MG/1
30 CAPSULE, DELAYED RELEASE ORAL DAILY
Qty: 90 CAPSULE | Refills: 1 | Status: SHIPPED | OUTPATIENT
Start: 2024-02-26

## 2024-02-26 RX ORDER — SIMVASTATIN 40 MG
40 TABLET ORAL NIGHTLY
Qty: 30 TABLET | Refills: 0 | Status: SHIPPED | OUTPATIENT
Start: 2024-02-26

## 2024-02-26 RX ORDER — MULTIVIT WITH MINERALS/LUTEIN
1000 TABLET ORAL DAILY
Qty: 30 CAPSULE | Refills: 0 | Status: SHIPPED | OUTPATIENT
Start: 2024-02-26 | End: 2024-03-27

## 2024-02-26 RX ORDER — LISINOPRIL 2.5 MG/1
5 TABLET ORAL DAILY
Qty: 60 TABLET | Refills: 1 | Status: SHIPPED | OUTPATIENT
Start: 2024-02-26 | End: 2024-03-01 | Stop reason: SDUPTHER

## 2024-02-26 RX ORDER — PROPRANOLOL HYDROCHLORIDE 40 MG/1
40 TABLET ORAL 3 TIMES DAILY
Qty: 90 TABLET | Refills: 0 | Status: SHIPPED | OUTPATIENT
Start: 2024-02-26

## 2024-02-26 RX ORDER — SENNOSIDES 8.6 MG
650 CAPSULE ORAL 2 TIMES DAILY
Qty: 180 TABLET | Refills: 1 | Status: SHIPPED | OUTPATIENT
Start: 2024-02-26 | End: 2024-08-24

## 2024-02-26 RX ORDER — ASPIRIN 81 MG/1
81 TABLET ORAL DAILY
Qty: 90 TABLET | Refills: 1 | Status: SHIPPED | OUTPATIENT
Start: 2024-02-26 | End: 2024-08-24

## 2024-02-26 RX ORDER — OMEPRAZOLE 20 MG/1
20 CAPSULE, DELAYED RELEASE ORAL DAILY
Qty: 30 CAPSULE | Refills: 0 | Status: SHIPPED | OUTPATIENT
Start: 2024-02-26

## 2024-02-26 RX ORDER — CELECOXIB 100 MG/1
100 CAPSULE ORAL 2 TIMES DAILY
Qty: 60 CAPSULE | Refills: 0 | Status: SHIPPED | OUTPATIENT
Start: 2024-02-26 | End: 2024-03-27

## 2024-02-26 RX ORDER — FUROSEMIDE 20 MG/1
20 TABLET ORAL 2 TIMES DAILY
Qty: 60 TABLET | Refills: 0 | Status: SHIPPED | OUTPATIENT
Start: 2024-02-26

## 2024-02-26 RX ORDER — PNV NO.95/FERROUS FUM/FOLIC AC 28MG-0.8MG
325 TABLET ORAL DAILY
Qty: 30 TABLET | Refills: 0 | Status: SHIPPED | OUTPATIENT
Start: 2024-02-26

## 2024-02-26 NOTE — TELEPHONE ENCOUNTER
Medication refill for     lisinopril (PRINIVIL;ZESTRIL) 2.5 MG tablet   Ferrous Sulfate (IRON) 325 (65 Fe) MG TABS  aspirin 81 MG EC tablet  vitamin E 1000 units capsule   furosemide (LASIX) 20 MG tablet  acetaminophen (TYLENOL) 650 MG extended release tablet  omeprazole (PRILOSEC) 20 MG delayed release capsule   propranolol (INDERAL) 40 MG tablet  simvastatin (ZOCOR) 40 MG tablet  DULoxetine (CYMBALTA) 30 MG extended release capsule  celecoxib (CELEBREX) 100 MG capsule     [facility] [place] - [address] [place] [address] -  [phone] - F [phone]

## 2024-02-29 DIAGNOSIS — I10 PRIMARY HYPERTENSION: ICD-10-CM

## 2024-02-29 NOTE — TELEPHONE ENCOUNTER
Patient calling to say he did not get refill of:  LISINOPRIL   CIALIS     We can send this to:  [facility] - [address] [facility] -  [phone] - F [phone]

## 2024-03-01 RX ORDER — LISINOPRIL 2.5 MG/1
5 TABLET ORAL DAILY
Qty: 60 TABLET | Refills: 1 | Status: SHIPPED | OUTPATIENT
Start: 2024-03-01 | End: 2024-04-30

## 2024-03-01 NOTE — TELEPHONE ENCOUNTER
LVM informing patient to call urology for information on prescription of cialis. Call our office with concern.

## 2024-03-08 ENCOUNTER — TELEPHONE (OUTPATIENT)
Age: 64
End: 2024-03-08

## 2024-03-08 NOTE — TELEPHONE ENCOUNTER
Serenity called from [facility] and stated that [facility] ([facility] ) need the DMAS form to be fax back over. Need to be done for the patient Rollator order.   Fax: [phone]

## 2024-03-08 NOTE — TELEPHONE ENCOUNTER
Patient was calling to ask about his Rolator issue and being able to obtain one to be able to get around easier    Patient eventually broke down in tears about his situation regarding everything he has attempted to do to rectify his pain issues and mental  Patient states nurse and PCP have gone above and beyond their duties to help patient but at this time \"he gives up\" he stated this multiple times while on the call with me but also stated he was not going to harm himself  Patient states he went to Rheumatology and was prescribed CIALIS but patient did not see a reason to be on this medication as he is not sexually active  Patient does not know why he scheduled a follow up with provider here as \"he has nothing to update her about since nobody else will help me\"    Patient believes that he is being treated unfairly and being judged that he was incarcerated for twenty years   Patient VERY upset on the phone, I asked him what WE could to do help in the meantime, he continued to tell me \"nothing, just let nurse Kateryna and Nadia that I give up\"

## 2024-03-11 ENCOUNTER — TELEPHONE (OUTPATIENT)
Age: 64
End: 2024-03-11

## 2024-03-11 NOTE — TELEPHONE ENCOUNTER
Per Practice Manager request, nurse submitted new electronic request for a rollator for patient through the [employer] On-Line Portal.  [facility] accepted the rollator order. Script signed by PCP & submitted along with patient's last office visit notes including supporting medical diagnoses & documentation. [employer] Order ID: [id number] with a length of need for 99 months (which is DME code for 'as long as the patient needs it &/ or as long as insurance will cover'). Nurse will follow case in the [employer] Portal to watch for delivery of DME to patient. PCP notified.

## 2024-03-14 DIAGNOSIS — I10 PRIMARY HYPERTENSION: ICD-10-CM

## 2024-03-14 RX ORDER — LISINOPRIL 5 MG/1
5 TABLET ORAL DAILY
Qty: 90 TABLET | Refills: 1 | Status: SHIPPED | OUTPATIENT
Start: 2024-03-14

## 2024-03-14 NOTE — TELEPHONE ENCOUNTER
Serenity jeong called to check progress of Remigioator, explained was sent though [place] and form was sent by fax this am.

## 2024-03-14 NOTE — TELEPHONE ENCOUNTER
Spoke with patient and will help with rheumatology appointment. Patient will also speak with provider about pain management at next appointment.

## 2024-03-15 ENCOUNTER — TELEPHONE (OUTPATIENT)
Age: 64
End: 2024-03-15

## 2024-03-15 NOTE — TELEPHONE ENCOUNTER
Izzy the RN called [facility]  she is requesting a call back to discuss PT therapy and occupational therapy for the patient.    [phone].

## 2024-03-19 NOTE — TELEPHONE ENCOUNTER
Spoke with zack BARTLETT about PT and OT and possible Skilled nursing will contact offices today for referral.

## 2024-03-20 ENCOUNTER — TELEPHONE (OUTPATIENT)
Age: 64
End: 2024-03-20

## 2024-03-20 DIAGNOSIS — Z91.81 AT RISK FOR FALLS: ICD-10-CM

## 2024-03-20 DIAGNOSIS — R25.1 TREMOR OF BOTH HANDS: Primary | ICD-10-CM

## 2024-03-20 DIAGNOSIS — Z91.81 AT HIGH RISK FOR FALLS: ICD-10-CM

## 2024-03-20 DIAGNOSIS — R53.1 GENERALIZED WEAKNESS: ICD-10-CM

## 2024-03-20 DIAGNOSIS — Z91.81 HISTORY OF FALLING: ICD-10-CM

## 2024-03-20 DIAGNOSIS — R26.81 GAIT INSTABILITY: ICD-10-CM

## 2024-03-20 DIAGNOSIS — M15.9 OSTEOARTHRITIS OF MULTIPLE JOINTS, UNSPECIFIED OSTEOARTHRITIS TYPE: ICD-10-CM

## 2024-03-20 DIAGNOSIS — Z78.9 DECREASED INDEPENDENCE WITH ACTIVITIES OF DAILY LIVING: ICD-10-CM

## 2024-03-20 NOTE — TELEPHONE ENCOUNTER
Per PCP request, Home Health PT/ OT orders (with supporting documentation) faxed to [facility] at fax# [phone].   Nurse spoke with Izzy from [facility] (providing Home Care Aid Services); per Izzy patient would definitely benefit from Home Health Physical Therapy & Occupational Therapy. Patient currently has only a signal point cane & he is very unsteady when ambulating. He has a history of falls & he would benefit from a rollator, so nurse will include social work in home health order to assist in rollator DME acquisition. Per fax result report, all pages transmitted successfully to [facility]. Nurse bypassed [facility] & [facility] as these companies declined services. PCP notified.

## 2024-03-21 ENCOUNTER — TELEPHONE (OUTPATIENT)
Age: 64
End: 2024-03-21

## 2024-03-21 NOTE — TELEPHONE ENCOUNTER
Lucretia called from [facility] and she will like to inform the provider that the patient referral was decline due to insurance being out of network.  Any question call Lucretia [phone]

## 2024-03-22 ENCOUNTER — TELEPHONE (OUTPATIENT)
Age: 64
End: 2024-03-22

## 2024-03-22 NOTE — TELEPHONE ENCOUNTER
Nurse roxana'd notification that [facility] does not participate with patient's health insurance, so nurse called the [facility] Provider line (after failing to find any applicable information on the [facility] Provider Online Portal) & inquired about the names of participating Home Health Agencies. Nurse spent 25 min on the phone & the [employer] representative searched & searched & confirmed that patient does have Home Health Benefits, but [facility] was not able to find any participating Home Health agency names & numbers to provider, so [facility] provided some outpatient Physical Therapy company names & phone numbers. Patient prefers Home Health, so nurse faxed Home Health referral to [facility] in hopes they participate with patient's plan. Nurse called [facility] first, but  stated that referral needs to be faxed in first, then the intake nurse will confirm if [facility] is in network with the patient's health ins plan or not.   Time spent on this: 60 min thus far

## 2024-03-22 NOTE — TELEPHONE ENCOUNTER
Patient concerned with rolator not being ordered. explained to patient we are working on this and will be in touch with updates. Also with Home Health orders. patient thankful for the call.

## 2024-03-26 ENCOUNTER — TELEPHONE (OUTPATIENT)
Age: 64
End: 2024-03-26

## 2024-03-26 NOTE — TELEPHONE ENCOUNTER
Eri from [facility] called they are not in network with his insurance, she believes his insurance doesn't cover home health    Eri [phone]

## 2024-03-27 ENCOUNTER — TELEPHONE (OUTPATIENT)
Age: 64
End: 2024-03-27

## 2024-03-27 DIAGNOSIS — M15.9 OSTEOARTHRITIS OF MULTIPLE JOINTS, UNSPECIFIED OSTEOARTHRITIS TYPE: ICD-10-CM

## 2024-03-27 DIAGNOSIS — N48.6 PEYRONIE DISEASE: ICD-10-CM

## 2024-03-27 DIAGNOSIS — Z79.899 MEDICATION MANAGEMENT: ICD-10-CM

## 2024-03-27 DIAGNOSIS — R53.1 GENERALIZED WEAKNESS: ICD-10-CM

## 2024-03-27 DIAGNOSIS — R26.81 GAIT INSTABILITY: ICD-10-CM

## 2024-03-27 DIAGNOSIS — Z86.718 HISTORY OF MATERNAL DEEP VEIN THROMBOSIS (DVT): ICD-10-CM

## 2024-03-27 DIAGNOSIS — Z91.81 HISTORY OF FALLING: ICD-10-CM

## 2024-03-27 DIAGNOSIS — Z78.9 DECREASED INDEPENDENCE WITH ACTIVITIES OF DAILY LIVING: ICD-10-CM

## 2024-03-27 DIAGNOSIS — Z91.81 AT HIGH RISK FOR FALLS: ICD-10-CM

## 2024-03-27 DIAGNOSIS — D50.8 IRON DEFICIENCY ANEMIA SECONDARY TO INADEQUATE DIETARY IRON INTAKE: ICD-10-CM

## 2024-03-27 DIAGNOSIS — R25.1 TREMOR OF BOTH HANDS: Primary | ICD-10-CM

## 2024-03-27 DIAGNOSIS — I10 PRIMARY HYPERTENSION: ICD-10-CM

## 2024-03-27 DIAGNOSIS — R33.9 BLADDER RETENTION OF URINE: ICD-10-CM

## 2024-03-27 DIAGNOSIS — Z87.59 HISTORY OF MATERNAL DEEP VEIN THROMBOSIS (DVT): ICD-10-CM

## 2024-03-27 NOTE — TELEPHONE ENCOUNTER
Nurse roxana'd fax [facility] denying services as they are out of network with patient's health insurance. Nurse spoke with patient & explained the delay in the start of home health was due to finding an agency who participates with his [facility] Medicaid. Patient requested that Home Health orders be faxed to [facility] as he heard they are in network. Patient was very pleasant & verbalized appreciation of call. Patient confirmed that [facility], Izzy from [facility] comes to his house several times a month to help him, but he is still in need of home health skilled nursing, physical therapy & occupational therapy.  Nurse faxed Home Health orders, pt demographics & supporting medical documentation to [facility]. Per fax result report, all 11 pages transmitted successfully.

## 2024-04-01 DIAGNOSIS — N48.6 PEYRONIE DISEASE: ICD-10-CM

## 2024-04-01 DIAGNOSIS — I10 PRIMARY HYPERTENSION: ICD-10-CM

## 2024-04-01 DIAGNOSIS — R25.1 TREMOR OF BOTH HANDS: ICD-10-CM

## 2024-04-01 DIAGNOSIS — D50.8 IRON DEFICIENCY ANEMIA SECONDARY TO INADEQUATE DIETARY IRON INTAKE: ICD-10-CM

## 2024-04-01 DIAGNOSIS — E78.2 MIXED HYPERLIPIDEMIA: ICD-10-CM

## 2024-04-01 DIAGNOSIS — M15.9 OSTEOARTHRITIS OF MULTIPLE JOINTS, UNSPECIFIED OSTEOARTHRITIS TYPE: ICD-10-CM

## 2024-04-01 DIAGNOSIS — F32.9 REACTIVE DEPRESSION: ICD-10-CM

## 2024-04-01 RX ORDER — PROPRANOLOL HYDROCHLORIDE 40 MG/1
40 TABLET ORAL 3 TIMES DAILY
Qty: 90 TABLET | Refills: 0 | Status: SHIPPED | OUTPATIENT
Start: 2024-04-01

## 2024-04-01 RX ORDER — SENNOSIDES 8.6 MG
650 CAPSULE ORAL 2 TIMES DAILY
Qty: 180 TABLET | Refills: 1 | Status: SHIPPED | OUTPATIENT
Start: 2024-04-01 | End: 2024-09-28

## 2024-04-01 RX ORDER — CELECOXIB 100 MG/1
100 CAPSULE ORAL 2 TIMES DAILY
Qty: 60 CAPSULE | Refills: 0 | Status: SHIPPED | OUTPATIENT
Start: 2024-04-01 | End: 2024-04-02 | Stop reason: DRUGHIGH

## 2024-04-01 RX ORDER — FUROSEMIDE 20 MG/1
20 TABLET ORAL 2 TIMES DAILY
Qty: 60 TABLET | Refills: 0 | Status: SHIPPED | OUTPATIENT
Start: 2024-04-01

## 2024-04-01 RX ORDER — LISINOPRIL 5 MG/1
5 TABLET ORAL DAILY
Qty: 90 TABLET | Refills: 1 | Status: SHIPPED | OUTPATIENT
Start: 2024-04-01

## 2024-04-01 RX ORDER — MULTIVIT WITH MINERALS/LUTEIN
1000 TABLET ORAL DAILY
Qty: 30 CAPSULE | Refills: 0 | Status: SHIPPED | OUTPATIENT
Start: 2024-04-01 | End: 2024-05-01

## 2024-04-01 RX ORDER — DULOXETIN HYDROCHLORIDE 30 MG/1
30 CAPSULE, DELAYED RELEASE ORAL DAILY
Qty: 90 CAPSULE | Refills: 1 | Status: SHIPPED | OUTPATIENT
Start: 2024-04-01 | End: 2024-04-02 | Stop reason: SDUPTHER

## 2024-04-01 RX ORDER — PNV NO.95/FERROUS FUM/FOLIC AC 28MG-0.8MG
325 TABLET ORAL DAILY
Qty: 30 TABLET | Refills: 0 | Status: SHIPPED | OUTPATIENT
Start: 2024-04-01

## 2024-04-01 RX ORDER — ASPIRIN 81 MG/1
81 TABLET ORAL DAILY
Qty: 90 TABLET | Refills: 1 | Status: SHIPPED | OUTPATIENT
Start: 2024-04-01 | End: 2024-09-28

## 2024-04-01 RX ORDER — OMEPRAZOLE 20 MG/1
20 CAPSULE, DELAYED RELEASE ORAL DAILY
Qty: 30 CAPSULE | Refills: 0 | Status: SHIPPED | OUTPATIENT
Start: 2024-04-01

## 2024-04-01 NOTE — TELEPHONE ENCOUNTER
Patient was calling for refill of medication - patient would like everything called in on his chart EXCEPT Cialis     [facility]  [facility] [address] - P [phone] - F [phone] [address] [facility] [address]  Phone: [phone]  Fax: [phone]

## 2024-04-02 ENCOUNTER — OFFICE VISIT (OUTPATIENT)
Age: 64
End: 2024-04-02

## 2024-04-02 VITALS
DIASTOLIC BLOOD PRESSURE: 58 MMHG | HEART RATE: 64 BPM | RESPIRATION RATE: 16 BRPM | TEMPERATURE: 97.8 F | SYSTOLIC BLOOD PRESSURE: 130 MMHG | OXYGEN SATURATION: 95 % | WEIGHT: 138 LBS | HEIGHT: 67 IN | BODY MASS INDEX: 21.66 KG/M2

## 2024-04-02 DIAGNOSIS — F32.9 REACTIVE DEPRESSION: ICD-10-CM

## 2024-04-02 DIAGNOSIS — R25.1 TREMOR OF BOTH HANDS: ICD-10-CM

## 2024-04-02 DIAGNOSIS — M15.9 OSTEOARTHRITIS OF MULTIPLE JOINTS, UNSPECIFIED OSTEOARTHRITIS TYPE: Primary | ICD-10-CM

## 2024-04-02 DIAGNOSIS — N48.6 PEYRONIE DISEASE: ICD-10-CM

## 2024-04-02 DIAGNOSIS — Z86.19 HISTORY OF HEPATITIS C: ICD-10-CM

## 2024-04-02 PROCEDURE — 3075F SYST BP GE 130 - 139MM HG: CPT | Performed by: NURSE PRACTITIONER

## 2024-04-02 PROCEDURE — 99214 OFFICE O/P EST MOD 30 MIN: CPT | Performed by: NURSE PRACTITIONER

## 2024-04-02 PROCEDURE — 3078F DIAST BP <80 MM HG: CPT | Performed by: NURSE PRACTITIONER

## 2024-04-02 RX ORDER — DULOXETIN HYDROCHLORIDE 30 MG/1
30 CAPSULE, DELAYED RELEASE ORAL 2 TIMES DAILY
Qty: 180 CAPSULE | Refills: 0 | Status: SHIPPED | OUTPATIENT
Start: 2024-04-02 | End: 2024-07-01

## 2024-04-02 RX ORDER — CELECOXIB 100 MG/1
100 CAPSULE ORAL DAILY
Qty: 30 CAPSULE | Refills: 0 | Status: SHIPPED | OUTPATIENT
Start: 2024-04-02 | End: 2024-05-02

## 2024-04-02 ASSESSMENT — ENCOUNTER SYMPTOMS
BLOOD IN STOOL: 0
NAUSEA: 0
BACK PAIN: 0
EYES NEGATIVE: 1
ABDOMINAL PAIN: 0
GASTROINTESTINAL NEGATIVE: 1
COUGH: 0
VOMITING: 0
EYE REDNESS: 0
CHEST TIGHTNESS: 0
EYE PAIN: 0
SHORTNESS OF BREATH: 0
RESPIRATORY NEGATIVE: 1
CONSTIPATION: 0
DIARRHEA: 0
SINUS PRESSURE: 0
RHINORRHEA: 0
SINUS PAIN: 0

## 2024-04-02 NOTE — PROGRESS NOTES
Assessment and Plan     1. Osteoarthritis of multiple joints, unspecified osteoarthritis type: Duloxetine dose increased, recommended to take Celebrex once per day. Continue with Tylenol as needed for pain. Referral to pain management given. Ice/warm compresses recommended. Pt verbalized understanding.   -     celecoxib (CELEBREX) 100 MG capsule; Take 1 capsule by mouth daily, Disp-30 capsule, R-0Normal  -     DULoxetine (CYMBALTA) 30 MG extended release capsule; Take 1 capsule by mouth 2 times daily, Disp-180 capsule, R-0Normal  -     Alexx Amor MD, Pain Medicine, [place]  2. Peyronie disease: Continue to follow up with urologist.   3. Reactive depression: Emotional support provided. Recommended to see a therapist. Will increase Duloxetine. Non-pharmaceutical symptoms discussed. Crisis plan discussed. Pt verbalized understanding.   -     DULoxetine (CYMBALTA) 30 MG extended release capsule; Take 1 capsule by mouth 2 times daily, Disp-180 capsule, R-0Normal  4. Tremor of both hands: Continue with Propranolol. Will follow up with neurologist next month  5. History of hepatitis C: Liver function stable. Will follow up with hepatologist next month.        Benefits, risks, possible drug interactions, and side effects of all new medications were reviewed with the patient. Pt verbalized understanding.    An electronic signature was used to authenticate this note.  Nadia Hendrix, GRANT - CNP  4/2/2024      Follow-up and Dispositions    Return in about 6 months (around 10/2/2024).          History of Present Illness   Chief Complaint     Kenneth Nina is a 63 y.o. male here for had concerns including Follow-up (Patient reports he has not been able to schedule appointments with any specialist.).   Mr. Nina presents today follow up HTN , chronic pain and peyronie disease. Pt does not check blood pressure at home. Pt has home aid M-F in the morning who helped him with meal and medications. He

## 2024-04-03 ENCOUNTER — TELEPHONE (OUTPATIENT)
Age: 64
End: 2024-04-03

## 2024-04-12 ENCOUNTER — OFFICE VISIT (OUTPATIENT)
Age: 64
End: 2024-04-12
Payer: MEDICAID

## 2024-04-12 VITALS
WEIGHT: 138 LBS | DIASTOLIC BLOOD PRESSURE: 51 MMHG | SYSTOLIC BLOOD PRESSURE: 102 MMHG | HEART RATE: 57 BPM | BODY MASS INDEX: 21.66 KG/M2 | HEIGHT: 67 IN | OXYGEN SATURATION: 97 %

## 2024-04-12 DIAGNOSIS — G25.0 ESSENTIAL TREMOR: Primary | ICD-10-CM

## 2024-04-12 PROCEDURE — 99204 OFFICE O/P NEW MOD 45 MIN: CPT | Performed by: PSYCHIATRY & NEUROLOGY

## 2024-04-12 PROCEDURE — 3074F SYST BP LT 130 MM HG: CPT | Performed by: PSYCHIATRY & NEUROLOGY

## 2024-04-12 PROCEDURE — 3078F DIAST BP <80 MM HG: CPT | Performed by: PSYCHIATRY & NEUROLOGY

## 2024-04-12 RX ORDER — PRIMIDONE 50 MG/1
TABLET ORAL
Qty: 120 TABLET | Refills: 0 | Status: SHIPPED | OUTPATIENT
Start: 2024-04-12

## 2024-04-12 RX ORDER — PRIMIDONE 50 MG/1
TABLET ORAL
Qty: 120 TABLET | Refills: 3 | Status: SHIPPED | OUTPATIENT
Start: 2024-04-12

## 2024-04-12 RX ORDER — PROPRANOLOL HYDROCHLORIDE 40 MG/1
40 TABLET ORAL 2 TIMES DAILY
Qty: 60 TABLET | Refills: 5 | Status: SHIPPED | OUTPATIENT
Start: 2024-04-12

## 2024-04-12 RX ORDER — TADALAFIL 5 MG/1
TABLET ORAL
COMMUNITY
Start: 2024-03-01

## 2024-04-12 NOTE — PROGRESS NOTES
[facility] Neurology Clinics and [facility]diagn[facility] at [facility] Neu[facility] Clinics at [facility]  [address] [place] [address]  [address] [place] [address] [phone] Office  [phone] Facsimile           Referring: Niles Hendrix, Nadia, APRN - CNP  [address]  [address]  [place] [address]     Chief Complaint   Patient presents with    New Patient     Pt states he has had tremors most of his life. Pt has only used propanolol that has been prescribed by his primary care doctors. Pt states his tremors are mostly in hands. Pt states he has been to a neurologist in the past but that was before tremors started.     63-year-old gentleman presents today for initial neurologic consultation regarding worsening tremor.  He says that he has had the tremor for the majority of his life.  Both his hands shake.  His voice will shake.  If he is anxious or upset the tremor gets worse.  His handwriting has been affected.  That is also been affected by his multifocal joint pain.  He is trying to get in with rheumatology.  He has difficulty with a screwdriver.  He has to use both hands to hold a glass.  He has trouble with eating.  No focal deficits.  No family history of tremor.  He does not drink so he does not know what alcohol does to the tremor.  He has only been on Inderal at increasing dosages and he has now gotten to a dose of 40 mg 3 times daily.  He has times where he feels lightheaded like he is going to pass out.  This can happen with changes in position.  He has not passed out.    NP Mond[facility] office visit dated April 2, 2024 where patient was said to have osteoarthritis of multiple joints and he was given Celebrex and Cymbalta.  He was referred over to Dr. Rain.  He had Juli's disease following with urology.  He had reactive depression and the Cymbalta was also thought to be helpful there.  He had bilateral

## 2024-04-12 NOTE — PATIENT INSTRUCTIONS
Decrease your propranolol dose down to 40 mg twice daily eliminating the middle of the day dose to see if that can help the lightheadedness you are feeling as I think your blood pressure and pulse cannot withstand that higher dose of propranolol.    Monitor your blood pressure and pulse at home    Start taking Mysoline and for the first few weeks you start at 1/2 tablet nightly x 1 week and every week increase by 1/2 tablet until you are taking 2 tablets twice daily.  If at any point in the titration upward the tremor becomes tolerable, example, you are taking 1 tablet in the morning and 2 tablets at night and the tremor is tolerable, stay at that dose    Follow-up in about 10-12 weeks

## 2024-04-12 NOTE — PROGRESS NOTES
Identified pt with two pt identifiers(name and ). Reviewed record in preparation for visit and have obtained necessary documentation. All patient medications has been reviewed.  Chief Complaint   Patient presents with    New Patient     Pt states he has had tremors most of his life. Pt has only used propanolol that has been prescribed by his primary care doctors. Pt states his tremors are mostly in hands. Pt states he has been to a neurologist in the past but that was before tremors started.       Vitals:    24 1516   BP: (!) 102/51   Pulse: 57   SpO2: 97%                   Coordination of Care Questionnaire:   1) Have you been to an emergency room, urgent care, or hospitalized since your last visit?   N/a    2. Have seen or consulted any other health care provider since your last visit? N/a

## 2024-04-24 ENCOUNTER — TELEPHONE (OUTPATIENT)
Age: 64
End: 2024-04-24

## 2024-04-29 NOTE — TELEPHONE ENCOUNTER
Ordered up right walker for patient through [facility] also sent referrals to rheumatology for patient appointment. Spoke with patient, updated progress of upright walker and rheumatology appointment and grateful for the call.

## 2024-06-06 ENCOUNTER — OFFICE VISIT (OUTPATIENT)
Age: 64
End: 2024-06-06
Payer: MEDICAID

## 2024-06-06 VITALS
SYSTOLIC BLOOD PRESSURE: 115 MMHG | TEMPERATURE: 98 F | HEIGHT: 67 IN | BODY MASS INDEX: 22.38 KG/M2 | HEART RATE: 97 BPM | DIASTOLIC BLOOD PRESSURE: 73 MMHG | WEIGHT: 142.6 LBS | OXYGEN SATURATION: 100 %

## 2024-06-06 DIAGNOSIS — B18.2 CHRONIC HEPATITIS C WITHOUT HEPATIC COMA (HCC): Primary | ICD-10-CM

## 2024-06-06 LAB
ALBUMIN SERPL-MCNC: 3.8 G/DL (ref 3.5–5)
ALBUMIN/GLOB SERPL: 1.1 (ref 1.1–2.2)
ALP SERPL-CCNC: 66 U/L (ref 45–117)
ALT SERPL-CCNC: 17 U/L (ref 12–78)
ANION GAP SERPL CALC-SCNC: 7 MMOL/L (ref 5–15)
AST SERPL-CCNC: 14 U/L (ref 15–37)
BASOPHILS # BLD: 0 K/UL (ref 0–0.1)
BASOPHILS NFR BLD: 1 % (ref 0–1)
BILIRUB DIRECT SERPL-MCNC: 0.2 MG/DL (ref 0–0.2)
BILIRUB SERPL-MCNC: 0.4 MG/DL (ref 0.2–1)
BUN SERPL-MCNC: 17 MG/DL (ref 6–20)
BUN/CREAT SERPL: 15 (ref 12–20)
CALCIUM SERPL-MCNC: 9.5 MG/DL (ref 8.5–10.1)
CHLORIDE SERPL-SCNC: 102 MMOL/L (ref 97–108)
CO2 SERPL-SCNC: 27 MMOL/L (ref 21–32)
CREAT SERPL-MCNC: 1.13 MG/DL (ref 0.7–1.3)
DIFFERENTIAL METHOD BLD: ABNORMAL
EOSINOPHIL # BLD: 0.5 K/UL (ref 0–0.4)
EOSINOPHIL NFR BLD: 8 % (ref 0–7)
ERYTHROCYTE [DISTWIDTH] IN BLOOD BY AUTOMATED COUNT: 12.9 % (ref 11.5–14.5)
GLOBULIN SER CALC-MCNC: 3.5 G/DL (ref 2–4)
GLUCOSE SERPL-MCNC: 128 MG/DL (ref 65–100)
HCT VFR BLD AUTO: 38.6 % (ref 36.6–50.3)
HGB BLD-MCNC: 13.1 G/DL (ref 12.1–17)
IMM GRANULOCYTES # BLD AUTO: 0 K/UL (ref 0–0.04)
IMM GRANULOCYTES NFR BLD AUTO: 0 % (ref 0–0.5)
LYMPHOCYTES # BLD: 1.9 K/UL (ref 0.8–3.5)
LYMPHOCYTES NFR BLD: 30 % (ref 12–49)
MCH RBC QN AUTO: 29.2 PG (ref 26–34)
MCHC RBC AUTO-ENTMCNC: 33.9 G/DL (ref 30–36.5)
MCV RBC AUTO: 86 FL (ref 80–99)
MONOCYTES # BLD: 0.5 K/UL (ref 0–1)
MONOCYTES NFR BLD: 8 % (ref 5–13)
NEUTS SEG # BLD: 3.4 K/UL (ref 1.8–8)
NEUTS SEG NFR BLD: 53 % (ref 32–75)
NRBC # BLD: 0 K/UL (ref 0–0.01)
NRBC BLD-RTO: 0 PER 100 WBC
PLATELET # BLD AUTO: 174 K/UL (ref 150–400)
PMV BLD AUTO: 11.4 FL (ref 8.9–12.9)
POTASSIUM SERPL-SCNC: 3.8 MMOL/L (ref 3.5–5.1)
PROT SERPL-MCNC: 7.3 G/DL (ref 6.4–8.2)
RBC # BLD AUTO: 4.49 M/UL (ref 4.1–5.7)
SODIUM SERPL-SCNC: 136 MMOL/L (ref 136–145)
WBC # BLD AUTO: 6.3 K/UL (ref 4.1–11.1)

## 2024-06-06 PROCEDURE — 3078F DIAST BP <80 MM HG: CPT | Performed by: NURSE PRACTITIONER

## 2024-06-06 PROCEDURE — 99213 OFFICE O/P EST LOW 20 MIN: CPT | Performed by: NURSE PRACTITIONER

## 2024-06-06 PROCEDURE — 3074F SYST BP LT 130 MM HG: CPT | Performed by: NURSE PRACTITIONER

## 2024-06-06 RX ORDER — GABAPENTIN 100 MG/1
100 CAPSULE ORAL 3 TIMES DAILY
COMMUNITY
Start: 2024-06-04

## 2024-06-06 RX ORDER — TADALAFIL 20 MG/1
20 TABLET ORAL PRN
COMMUNITY
Start: 2024-05-22

## 2024-06-06 ASSESSMENT — PATIENT HEALTH QUESTIONNAIRE - PHQ9
SUM OF ALL RESPONSES TO PHQ9 QUESTIONS 1 & 2: 0
SUM OF ALL RESPONSES TO PHQ QUESTIONS 1-9: 0
2. FEELING DOWN, DEPRESSED OR HOPELESS: NOT AT ALL
1. LITTLE INTEREST OR PLEASURE IN DOING THINGS: NOT AT ALL
SUM OF ALL RESPONSES TO PHQ QUESTIONS 1-9: 0

## 2024-06-06 NOTE — PROGRESS NOTES
Rm    Chief Complaint   Patient presents with    6 Month Follow-Up        /73 (Site: Left Upper Arm)   Pulse 97   Temp 98 °F (36.7 °C)   Ht 1.702 m (5' 7\")   Wt 64.7 kg (142 lb 9.6 oz)   SpO2 100%   BMI 22.33 kg/m²      1. Have you been to the ER, urgent care clinic since your last visit?  Hospitalized since your last visit? No     2. Have you seen or consulted any other health care providers outside of the [facility] since your last visit?  Include any pap smears or colon screening. No     Health Maintenance Due   Topic Date Due    Pneumococcal 0-64 years Vaccine (1 of 2 - PCV) Never done    Hepatitis A vaccine (1 of 2 - Risk 2-dose series) Never done    Colorectal Cancer Screen  Never done    Respiratory Syncytial Virus (RSV) Pregnant or age 60 yrs+ (1 - 1-dose 60+ series) Never done    Shingles vaccine (2 of 2) 03/22/2024    COVID-19 Vaccine (7 - 2023-24 season) 03/22/2024             No data to display                 Failed to redirect to the Timeline version of the [employer] Smart[employer].    Failed to redirect to the Timeline version of the [employer] SmartLink.

## 2024-06-06 NOTE — PROGRESS NOTES
[facility]      Varun Camejo MD, FACP, FACG, FAASLD      DIEGO Barnes-ELIAS Martinez, [facility]   Lori Benitesalex, [facility]   Jerrica Lenz, [facility]-  Lev Castano, [facility]   Nimisha Washington, [facility]   Jacquelyn Aston, [facility]   [address], [address]   [place]  [address] [phone]   FAX: [phone]  [facility]   [address], [address]   [place]  [address] [phone]   FAX: [phone]       Patient Care Team:  Nadia Graves APRN - CNP as PCP - General (Nurse Practitioner)  Nadia Graves APRN - CNP as PCP - Empaneled Provider      Patient Active Problem List   Diagnosis    Mixed hyperlipidemia    Tremor of both hands    Primary hypertension    Peyronie disease    Osteoarthritis of multiple joints    Vitamin D deficiency    Iron deficiency anemia secondary to inadequate dietary iron intake    Gastroesophageal reflux disease without esophagitis    History of maternal deep vein thrombosis (DVT)    Lung nodule    Reactive depression     Kenneth Nina is being seen at [facility] for management of suspected cirrhosis that appears to be secondary to chronic HCV.  The active problem list, all pertinent past medical history, medications, liver histology, endoscopic studies, radiologic findings and laboratory findings related to the liver disorder were reviewed and discussed with the patient.      The patient is a 63 y.o. male who was found to have chronic liver disease and cirrhosis in the early 2000's while in the group home system. He has been incarcerated for the last twenty two years and was released one month ago.     Risk factors for acquiring HCV are IV drug use.       There was no history of acute icteric hepatitis at the time

## 2024-06-26 ENCOUNTER — TELEPHONE (OUTPATIENT)
Age: 64
End: 2024-06-26

## 2024-06-26 NOTE — TELEPHONE ENCOUNTER
The patient is requesting a call back to discuss dropping a urine sample off because he will be in the building at another appointment. He stated that his urine smells an it burns while urinating Psr offered an appointment she decline and stated that she will like to be advised.  [phone]

## 2024-06-27 ENCOUNTER — OFFICE VISIT (OUTPATIENT)
Age: 64
End: 2024-06-27
Payer: MEDICAID

## 2024-06-27 VITALS
OXYGEN SATURATION: 99 % | DIASTOLIC BLOOD PRESSURE: 64 MMHG | WEIGHT: 143 LBS | SYSTOLIC BLOOD PRESSURE: 144 MMHG | HEIGHT: 67 IN | HEART RATE: 51 BPM | RESPIRATION RATE: 16 BRPM | TEMPERATURE: 97.9 F | BODY MASS INDEX: 22.44 KG/M2

## 2024-06-27 VITALS
DIASTOLIC BLOOD PRESSURE: 68 MMHG | HEART RATE: 56 BPM | HEIGHT: 67 IN | RESPIRATION RATE: 14 BRPM | WEIGHT: 142 LBS | OXYGEN SATURATION: 99 % | BODY MASS INDEX: 22.29 KG/M2 | SYSTOLIC BLOOD PRESSURE: 143 MMHG

## 2024-06-27 DIAGNOSIS — R82.90 ABNORMAL URINE ODOR: ICD-10-CM

## 2024-06-27 DIAGNOSIS — I10 PRIMARY HYPERTENSION: ICD-10-CM

## 2024-06-27 DIAGNOSIS — N48.6 PEYRONIE DISEASE: ICD-10-CM

## 2024-06-27 DIAGNOSIS — R82.90 ABNORMAL URINE ODOR: Primary | ICD-10-CM

## 2024-06-27 DIAGNOSIS — G25.0 ESSENTIAL TREMOR: Primary | ICD-10-CM

## 2024-06-27 LAB
BILIRUBIN, URINE, POC: NEGATIVE
BLOOD URINE, POC: NEGATIVE
GLUCOSE URINE, POC: NEGATIVE
KETONES, URINE, POC: NEGATIVE
LEUKOCYTE ESTERASE, URINE, POC: NORMAL
NITRITE, URINE, POC: POSITIVE
PH, URINE, POC: 6 (ref 4.6–8)
PROTEIN,URINE, POC: NEGATIVE
SPECIFIC GRAVITY, URINE, POC: 1.01 (ref 1–1.03)
URINALYSIS CLARITY, POC: CLEAR
URINALYSIS COLOR, POC: NORMAL
UROBILINOGEN, POC: NORMAL

## 2024-06-27 PROCEDURE — 3077F SYST BP >= 140 MM HG: CPT | Performed by: NURSE PRACTITIONER

## 2024-06-27 PROCEDURE — 99213 OFFICE O/P EST LOW 20 MIN: CPT | Performed by: NURSE PRACTITIONER

## 2024-06-27 PROCEDURE — PBSHW AMB POC URINALYSIS DIP STICK MANUAL W/O MICRO: Performed by: NURSE PRACTITIONER

## 2024-06-27 PROCEDURE — 99214 OFFICE O/P EST MOD 30 MIN: CPT | Performed by: PSYCHIATRY & NEUROLOGY

## 2024-06-27 PROCEDURE — 3077F SYST BP >= 140 MM HG: CPT | Performed by: PSYCHIATRY & NEUROLOGY

## 2024-06-27 PROCEDURE — 3078F DIAST BP <80 MM HG: CPT | Performed by: NURSE PRACTITIONER

## 2024-06-27 PROCEDURE — 3078F DIAST BP <80 MM HG: CPT | Performed by: PSYCHIATRY & NEUROLOGY

## 2024-06-27 PROCEDURE — 81002 URINALYSIS NONAUTO W/O SCOPE: CPT | Performed by: NURSE PRACTITIONER

## 2024-06-27 RX ORDER — NITROFURANTOIN 25; 75 MG/1; MG/1
100 CAPSULE ORAL 2 TIMES DAILY
Qty: 10 CAPSULE | Refills: 0 | Status: SHIPPED | OUTPATIENT
Start: 2024-06-27 | End: 2024-07-02

## 2024-06-27 RX ORDER — PRIMIDONE 50 MG/1
TABLET ORAL
Qty: 120 TABLET | Refills: 11 | Status: SHIPPED | OUTPATIENT
Start: 2024-06-27

## 2024-06-27 ASSESSMENT — ENCOUNTER SYMPTOMS
RESPIRATORY NEGATIVE: 1
CHEST TIGHTNESS: 0
CONSTIPATION: 0
COUGH: 0
BLOOD IN STOOL: 0
NAUSEA: 0
VOMITING: 0
EYES NEGATIVE: 1
GASTROINTESTINAL NEGATIVE: 1
RHINORRHEA: 0
SINUS PRESSURE: 0
EYE PAIN: 0
EYE REDNESS: 0
DIARRHEA: 0
ABDOMINAL PAIN: 0
SINUS PAIN: 0
SHORTNESS OF BREATH: 0

## 2024-06-27 NOTE — PROGRESS NOTES
[facility] Neurology Clinics and Neurodiagnostic Center at [facility] Neurology Clinics at [address] [place] [address] [address] [place] [address] [phone]              Chief Complaint   Patient presents with    Tremors     Inderal 40 mg BID- not taking x 3 weeks, Primidone 100 mg BID - seems to be helping      Current Outpatient Medications   Medication Sig Dispense Refill    DULoxetine (CYMBALTA) 30 MG extended release capsule Take 1 capsule by mouth 2 times daily 180 capsule 0    Ferrous Sulfate (IRON) 325 (65 Fe) MG TABS Take 325 mg by mouth daily 30 tablet 0    furosemide (LASIX) 20 MG tablet Take 1 tablet by mouth 2 times daily 60 tablet 0    omeprazole (PRILOSEC) 20 MG delayed release capsule Take 1 capsule by mouth daily 30 capsule 0    simvastatin (ZOCOR) 40 MG tablet Take 1 tablet by mouth nightly 30 tablet 0    aspirin 81 MG EC tablet Take 1 tablet by mouth daily 90 tablet 1    acetaminophen (TYLENOL) 650 MG extended release tablet Take 1 tablet by mouth in the morning and 1 tablet in the evening. 180 tablet 1    gabapentin (NEURONTIN) 100 MG capsule Take 1 capsule by mouth 3 times daily.      tadalafil (CIALIS) 20 MG tablet Take 1 tablet by mouth as needed      primidone (MYSOLINE) 50 MG tablet 2 tabs po bid 120 tablet 3    lisinopril (PRINIVIL;ZESTRIL) 5 MG tablet Take 1 tablet by mouth daily 90 tablet 1    propranolol (INDERAL) 40 MG tablet Take 1 tablet by mouth 2 times daily (Patient not taking: Reported on 6/27/2024) 60 tablet 5    tadalafil (CIALIS) 5 MG tablet  (Patient not taking: Reported on 6/27/2024)      primidone (MYSOLINE) 50 MG tablet 1/2 tab qhs x 1 week then increase by 1/2 tablet weekly until taking 2 tabs bid (Patient not taking: Reported on 6/27/2024) 120 tablet 0     No current facility-administered medications for this visit.      No Known Allergies  Social History     Tobacco Use    Smoking

## 2024-06-27 NOTE — PROGRESS NOTES
Assessment and Plan     1. Abnormal urine odor: UA ? For UTI. Given symptoms and history of self catherization we will start prophylactic UTI treatment. Urine sent for culture. Hydration recommended. Return instructions given. Pt verbalized understanding.   -     AMB POC URINALYSIS DIP STICK MANUAL W/O MICRO  -     Culture, Urine; Future  -     nitrofurantoin, macrocrystal-monohydrate, (MACROBID) 100 MG capsule; Take 1 capsule by mouth 2 times daily for 5 days, Disp-10 capsule, R-0Normal  2. Peyronie disease: Continue to follow up with urologist.   3. Primary hypertension: Borderline, continue with low dose Lisinopril      Tests Preformed During Visit:    Results for orders placed or performed in visit on 06/27/24   AMB POC URINALYSIS DIP STICK MANUAL W/O MICRO   Result Value Ref Range    Color (UA POC) Light Yellow     Clarity (UA POC) Clear     Glucose, Urine, POC Negative     Bilirubin, Urine, POC Negative     Ketones, Urine, POC Negative     Specific Gravity, Urine, POC 1.010 1.001 - 1.035    Blood (UA POC) Negative     pH, Urine, POC 6.0 4.6 - 8.0    Protein, Urine, POC Negative     Urobilinogen, POC 0.2 mg/dL     Nitrite, Urine, POC Positive     Leukocyte Esterase, Urine, POC Trace       BP Readings from Last 3 Encounters:   06/27/24 (!) 144/64   06/27/24 (!) 143/68   06/06/24 115/73     Benefits, risks, possible drug interactions, and side effects of all new medications were reviewed with the patient. Pt verbalized understanding.    An electronic signature was used to authenticate this note.  GRANT Darling - CNP  6/27/2024      Follow-up and Dispositions    Return if symptoms worsen or fail to improve.          History of Present Illness   Chief Complaint     Kenneth Nina is a 63 y.o. male here for had concerns including Frequent/Recurrent UTI.   Mr. Nina presents today with reports of urine odor, pus from catheter for the past week.  Pt self cath 4-5 times per day. Stays

## 2024-06-29 LAB
BACTERIA SPEC CULT: ABNORMAL
CC UR VC: ABNORMAL
SERVICE CMNT-IMP: ABNORMAL

## 2024-07-17 ENCOUNTER — TELEPHONE (OUTPATIENT)
Age: 64
End: 2024-07-17

## 2024-07-17 DIAGNOSIS — E78.2 MIXED HYPERLIPIDEMIA: ICD-10-CM

## 2024-07-17 RX ORDER — OMEPRAZOLE 20 MG/1
20 CAPSULE, DELAYED RELEASE ORAL DAILY
Qty: 30 CAPSULE | Refills: 0 | Status: SHIPPED | OUTPATIENT
Start: 2024-07-17 | End: 2024-07-17

## 2024-07-17 RX ORDER — SIMVASTATIN 40 MG
40 TABLET ORAL NIGHTLY
Qty: 30 TABLET | Refills: 0 | Status: SHIPPED | OUTPATIENT
Start: 2024-07-17 | End: 2024-07-17

## 2024-07-17 RX ORDER — SIMVASTATIN 40 MG
40 TABLET ORAL NIGHTLY
Qty: 90 TABLET | Refills: 0 | Status: SHIPPED | OUTPATIENT
Start: 2024-07-17 | End: 2024-10-15

## 2024-07-17 RX ORDER — OMEPRAZOLE 20 MG/1
20 CAPSULE, DELAYED RELEASE ORAL DAILY
Qty: 90 CAPSULE | Refills: 0 | Status: SHIPPED | OUTPATIENT
Start: 2024-07-17 | End: 2024-10-15

## 2024-07-17 NOTE — TELEPHONE ENCOUNTER
Refill request for the following   simvastatin (ZOCOR) 40 MG tablet   omeprazole (PRILOSEC) 20 MG delayed release capsule       [facility] [place] - [address] [place] [address] [phone] - F [phone]

## 2024-08-13 ENCOUNTER — TELEPHONE (OUTPATIENT)
Age: 64
End: 2024-08-13

## 2024-08-13 NOTE — TELEPHONE ENCOUNTER
Izzy called from [facility] to inform the provider that [facility] is not taking new patient so she is requesting for a new referral sent to to a new provider for Arthritis.   Izzy [phone]

## 2024-08-20 DIAGNOSIS — D50.8 IRON DEFICIENCY ANEMIA SECONDARY TO INADEQUATE DIETARY IRON INTAKE: ICD-10-CM

## 2024-08-20 DIAGNOSIS — I10 PRIMARY HYPERTENSION: ICD-10-CM

## 2024-08-20 RX ORDER — PNV NO.95/FERROUS FUM/FOLIC AC 28MG-0.8MG
325 TABLET ORAL DAILY
Qty: 90 TABLET | Refills: 0 | Status: SHIPPED | OUTPATIENT
Start: 2024-08-20 | End: 2024-11-18

## 2024-08-20 RX ORDER — FUROSEMIDE 20 MG/1
20 TABLET ORAL 2 TIMES DAILY
Qty: 180 TABLET | Refills: 0 | Status: SHIPPED | OUTPATIENT
Start: 2024-08-20 | End: 2024-11-18

## 2024-09-12 ENCOUNTER — TELEPHONE (OUTPATIENT)
Age: 64
End: 2024-09-12

## 2024-09-17 DIAGNOSIS — I10 PRIMARY HYPERTENSION: ICD-10-CM

## 2024-09-17 RX ORDER — FUROSEMIDE 20 MG
20 TABLET ORAL 2 TIMES DAILY
Qty: 60 TABLET | Refills: 0 | OUTPATIENT
Start: 2024-09-17 | End: 2024-12-16

## 2024-09-19 DIAGNOSIS — D50.8 IRON DEFICIENCY ANEMIA SECONDARY TO INADEQUATE DIETARY IRON INTAKE: ICD-10-CM

## 2024-09-19 DIAGNOSIS — I10 PRIMARY HYPERTENSION: ICD-10-CM

## 2024-09-19 RX ORDER — PNV NO.95/FERROUS FUM/FOLIC AC 28MG-0.8MG
325 TABLET ORAL DAILY
Qty: 90 TABLET | Refills: 0 | Status: SHIPPED | OUTPATIENT
Start: 2024-09-19 | End: 2024-12-18

## 2024-09-19 RX ORDER — FUROSEMIDE 20 MG
20 TABLET ORAL 2 TIMES DAILY
Qty: 180 TABLET | Refills: 0 | Status: SHIPPED | OUTPATIENT
Start: 2024-09-19 | End: 2024-12-18

## 2024-09-20 ENCOUNTER — TELEPHONE (OUTPATIENT)
Age: 64
End: 2024-09-20

## 2024-09-23 ENCOUNTER — TELEPHONE (OUTPATIENT)
Age: 64
End: 2024-09-23

## 2024-09-23 NOTE — TELEPHONE ENCOUNTER
The patient called to inform the provider that he just receive his Covid (Phizer)  and flu shot. 09/23/2024. He will like to be advise on the RSV shot. [phone]

## 2024-09-23 NOTE — TELEPHONE ENCOUNTER
Spoke with patient and gave pcp recommendation on getting RSV vaccine. Patient was thankful for the call.

## 2024-10-21 ENCOUNTER — TELEPHONE (OUTPATIENT)
Age: 64
End: 2024-10-21

## 2024-10-21 DIAGNOSIS — E78.2 MIXED HYPERLIPIDEMIA: ICD-10-CM

## 2024-10-21 NOTE — TELEPHONE ENCOUNTER
Medication refill     omeprazole (PRILOSEC) 20 MG delayed release capsule     [facility] [place] [facility] [place], [place] - [address]  - P [phone] - F [phone] [[id number]]

## 2024-10-22 ENCOUNTER — OFFICE VISIT (OUTPATIENT)
Age: 64
End: 2024-10-22
Payer: MEDICAID

## 2024-10-22 VITALS
BODY MASS INDEX: 22.13 KG/M2 | TEMPERATURE: 98.4 F | HEIGHT: 67 IN | SYSTOLIC BLOOD PRESSURE: 106 MMHG | WEIGHT: 141 LBS | RESPIRATION RATE: 16 BRPM | OXYGEN SATURATION: 96 % | HEART RATE: 73 BPM | DIASTOLIC BLOOD PRESSURE: 66 MMHG

## 2024-10-22 DIAGNOSIS — Z51.81 MEDICATION MONITORING ENCOUNTER: ICD-10-CM

## 2024-10-22 DIAGNOSIS — M15.9 OSTEOARTHRITIS OF MULTIPLE JOINTS, UNSPECIFIED OSTEOARTHRITIS TYPE: ICD-10-CM

## 2024-10-22 DIAGNOSIS — Z51.81 MEDICATION MONITORING ENCOUNTER: Primary | ICD-10-CM

## 2024-10-22 DIAGNOSIS — R42 EPISODE OF DIZZINESS: ICD-10-CM

## 2024-10-22 DIAGNOSIS — I10 PRIMARY HYPERTENSION: ICD-10-CM

## 2024-10-22 PROCEDURE — 99213 OFFICE O/P EST LOW 20 MIN: CPT | Performed by: NURSE PRACTITIONER

## 2024-10-22 PROCEDURE — 3078F DIAST BP <80 MM HG: CPT | Performed by: NURSE PRACTITIONER

## 2024-10-22 PROCEDURE — 3074F SYST BP LT 130 MM HG: CPT | Performed by: NURSE PRACTITIONER

## 2024-10-22 RX ORDER — LISINOPRIL 5 MG/1
2.5 TABLET ORAL DAILY
Qty: 90 TABLET | Refills: 1 | Status: SHIPPED | OUTPATIENT
Start: 2024-10-22

## 2024-10-22 RX ORDER — LISINOPRIL 5 MG/1
2.5 TABLET ORAL DAILY
Qty: 90 TABLET | Refills: 1 | Status: SHIPPED | OUTPATIENT
Start: 2024-10-22 | End: 2024-10-22 | Stop reason: SDUPTHER

## 2024-10-22 ASSESSMENT — ENCOUNTER SYMPTOMS
EYES NEGATIVE: 1
EYE REDNESS: 0
SINUS PRESSURE: 0
RESPIRATORY NEGATIVE: 1
RHINORRHEA: 0
BACK PAIN: 0
NAUSEA: 0
COUGH: 0
DIARRHEA: 0
EYE PAIN: 0
SINUS PAIN: 0
CONSTIPATION: 0
BLOOD IN STOOL: 0
GASTROINTESTINAL NEGATIVE: 1
ABDOMINAL PAIN: 0
VOMITING: 0
CHEST TIGHTNESS: 0
SHORTNESS OF BREATH: 0

## 2024-10-22 NOTE — PROGRESS NOTES
medications for this visit.     Patient Active Problem List   Diagnosis    Mixed hyperlipidemia    Tremor of both hands    Primary hypertension    Peyronie disease    Osteoarthritis of multiple joints    Vitamin D deficiency    Iron deficiency anemia secondary to inadequate dietary iron intake    Gastroesophageal reflux disease without esophagitis    History of maternal deep vein thrombosis (DVT)    Lung nodule    Reactive depression     Past Surgical History:   Procedure Laterality Date    COLONOSCOPY  2018    PENIS SURGERY      x 3 due to peyroonies disease.      Social History     Tobacco Use    Smoking status: Former     Current packs/day: 0.00     Average packs/day: 0.5 packs/day for 26.0 years (13.0 ttl pk-yrs)     Types: Cigarettes     Start date:      Quit date:      Years since quittin.8     Passive exposure: Past    Smokeless tobacco: Never   Substance Use Topics    Alcohol use: Not Currently      Family History   Problem Relation Age of Onset    Dementia Mother     Kidney Cancer Mother     Heart Disease Father     Alcohol Abuse Brother         Physical Exam   Vitals:       /66 (Site: Left Upper Arm, Position: Sitting, Cuff Size: Small Adult)   Pulse 73   Temp 98.4 °F (36.9 °C) (Oral)   Resp 16   Ht 1.702 m (5' 7\")   Wt 64 kg (141 lb)   SpO2 96%   BMI 22.08 kg/m²      Physical Exam  Vitals reviewed.   Constitutional:       General: He is not in acute distress.     Appearance: Normal appearance. He is not ill-appearing.   HENT:      Head: Normocephalic and atraumatic.      Right Ear: Tympanic membrane, ear canal and external ear normal. There is no impacted cerumen.      Left Ear: Tympanic membrane, ear canal and external ear normal. There is no impacted cerumen.   Cardiovascular:      Rate and Rhythm: Normal rate and regular rhythm.      Pulses: Normal pulses.      Heart sounds: Normal heart sounds.   Pulmonary:      Effort: Pulmonary effort is normal.      Breath sounds: Normal

## 2024-10-25 LAB — DRUGS UR: NORMAL

## 2024-11-18 DIAGNOSIS — F32.9 REACTIVE DEPRESSION: ICD-10-CM

## 2024-11-18 DIAGNOSIS — M15.9 OSTEOARTHRITIS OF MULTIPLE JOINTS, UNSPECIFIED OSTEOARTHRITIS TYPE: ICD-10-CM

## 2024-11-18 DIAGNOSIS — E78.2 MIXED HYPERLIPIDEMIA: ICD-10-CM

## 2024-11-18 RX ORDER — SIMVASTATIN 40 MG
40 TABLET ORAL NIGHTLY
Qty: 90 TABLET | Refills: 0 | Status: SHIPPED | OUTPATIENT
Start: 2024-11-18 | End: 2025-02-16

## 2024-11-18 RX ORDER — DULOXETIN HYDROCHLORIDE 30 MG/1
30 CAPSULE, DELAYED RELEASE ORAL 2 TIMES DAILY
Qty: 180 CAPSULE | Refills: 0 | Status: SHIPPED | OUTPATIENT
Start: 2024-11-18 | End: 2025-02-16

## 2024-11-18 NOTE — TELEPHONE ENCOUNTER
Patient requesting medication refills:    DULoxetine (CYMBALTA) 30 MG extended release capsule   simvastatin (ZOCOR) 40 MG tablet     [facility] [place], [place] - [address] - P [phone] - F [phone]  [address] [address]  Phone: [phone]  Fax: [phone]     Patient last seen October 2024

## 2025-02-17 DIAGNOSIS — E78.2 MIXED HYPERLIPIDEMIA: ICD-10-CM

## 2025-02-18 RX ORDER — SIMVASTATIN 40 MG
40 TABLET ORAL NIGHTLY
Qty: 30 TABLET | Refills: 2 | Status: SHIPPED | OUTPATIENT
Start: 2025-02-18 | End: 2025-05-19

## 2025-02-21 DIAGNOSIS — I10 PRIMARY HYPERTENSION: ICD-10-CM

## 2025-02-21 DIAGNOSIS — M15.9 OSTEOARTHRITIS OF MULTIPLE JOINTS, UNSPECIFIED OSTEOARTHRITIS TYPE: ICD-10-CM

## 2025-02-21 DIAGNOSIS — F32.9 REACTIVE DEPRESSION: ICD-10-CM

## 2025-02-21 RX ORDER — ASPIRIN 81 MG/1
81 TABLET ORAL DAILY
Qty: 90 TABLET | Refills: 1 | Status: SHIPPED | OUTPATIENT
Start: 2025-02-21 | End: 2025-08-20

## 2025-02-21 RX ORDER — SENNOSIDES 8.6 MG
650 CAPSULE ORAL 2 TIMES DAILY
Qty: 180 TABLET | Refills: 1 | Status: SHIPPED | OUTPATIENT
Start: 2025-02-21 | End: 2025-08-20

## 2025-02-21 RX ORDER — DULOXETIN HYDROCHLORIDE 30 MG/1
30 CAPSULE, DELAYED RELEASE ORAL 2 TIMES DAILY
Qty: 180 CAPSULE | Refills: 1 | Status: SHIPPED | OUTPATIENT
Start: 2025-02-21 | End: 2025-08-20

## 2025-03-18 ENCOUNTER — OFFICE VISIT (OUTPATIENT)
Facility: CLINIC | Age: 65
End: 2025-03-18
Payer: MEDICAID

## 2025-03-18 VITALS
HEIGHT: 67 IN | WEIGHT: 155.2 LBS | HEART RATE: 76 BPM | TEMPERATURE: 97.8 F | BODY MASS INDEX: 24.36 KG/M2 | RESPIRATION RATE: 16 BRPM | DIASTOLIC BLOOD PRESSURE: 70 MMHG | SYSTOLIC BLOOD PRESSURE: 134 MMHG | OXYGEN SATURATION: 97 %

## 2025-03-18 DIAGNOSIS — G62.9 NEUROPATHY: Primary | ICD-10-CM

## 2025-03-18 DIAGNOSIS — I10 PRIMARY HYPERTENSION: ICD-10-CM

## 2025-03-18 DIAGNOSIS — F32.9 REACTIVE DEPRESSION: ICD-10-CM

## 2025-03-18 PROCEDURE — 3075F SYST BP GE 130 - 139MM HG: CPT | Performed by: NURSE PRACTITIONER

## 2025-03-18 PROCEDURE — 3078F DIAST BP <80 MM HG: CPT | Performed by: NURSE PRACTITIONER

## 2025-03-18 PROCEDURE — 99213 OFFICE O/P EST LOW 20 MIN: CPT | Performed by: NURSE PRACTITIONER

## 2025-03-18 RX ORDER — PREGABALIN 75 MG/1
75 CAPSULE ORAL 3 TIMES DAILY
Qty: 270 CAPSULE | Refills: 0 | Status: SHIPPED | OUTPATIENT
Start: 2025-03-18 | End: 2025-06-16

## 2025-03-18 RX ORDER — PREGABALIN 75 MG/1
75 CAPSULE ORAL 3 TIMES DAILY
COMMUNITY
End: 2025-03-18 | Stop reason: SDUPTHER

## 2025-03-18 SDOH — ECONOMIC STABILITY: FOOD INSECURITY: WITHIN THE PAST 12 MONTHS, YOU WORRIED THAT YOUR FOOD WOULD RUN OUT BEFORE YOU GOT MONEY TO BUY MORE.: NEVER TRUE

## 2025-03-18 SDOH — ECONOMIC STABILITY: FOOD INSECURITY: WITHIN THE PAST 12 MONTHS, THE FOOD YOU BOUGHT JUST DIDN'T LAST AND YOU DIDN'T HAVE MONEY TO GET MORE.: NEVER TRUE

## 2025-03-18 ASSESSMENT — PATIENT HEALTH QUESTIONNAIRE - PHQ9
SUM OF ALL RESPONSES TO PHQ QUESTIONS 1-9: 14
2. FEELING DOWN, DEPRESSED OR HOPELESS: NEARLY EVERY DAY
7. TROUBLE CONCENTRATING ON THINGS, SUCH AS READING THE NEWSPAPER OR WATCHING TELEVISION: NEARLY EVERY DAY
SUM OF ALL RESPONSES TO PHQ QUESTIONS 1-9: 14
10. IF YOU CHECKED OFF ANY PROBLEMS, HOW DIFFICULT HAVE THESE PROBLEMS MADE IT FOR YOU TO DO YOUR WORK, TAKE CARE OF THINGS AT HOME, OR GET ALONG WITH OTHER PEOPLE: VERY DIFFICULT
8. MOVING OR SPEAKING SO SLOWLY THAT OTHER PEOPLE COULD HAVE NOTICED. OR THE OPPOSITE, BEING SO FIGETY OR RESTLESS THAT YOU HAVE BEEN MOVING AROUND A LOT MORE THAN USUAL: NOT AT ALL
6. FEELING BAD ABOUT YOURSELF - OR THAT YOU ARE A FAILURE OR HAVE LET YOURSELF OR YOUR FAMILY DOWN: NEARLY EVERY DAY
SUM OF ALL RESPONSES TO PHQ QUESTIONS 1-9: 14
9. THOUGHTS THAT YOU WOULD BE BETTER OFF DEAD, OR OF HURTING YOURSELF: NOT AT ALL
4. FEELING TIRED OR HAVING LITTLE ENERGY: MORE THAN HALF THE DAYS
5. POOR APPETITE OR OVEREATING: NOT AT ALL
1. LITTLE INTEREST OR PLEASURE IN DOING THINGS: MORE THAN HALF THE DAYS
SUM OF ALL RESPONSES TO PHQ QUESTIONS 1-9: 14
3. TROUBLE FALLING OR STAYING ASLEEP: SEVERAL DAYS

## 2025-03-18 ASSESSMENT — ENCOUNTER SYMPTOMS
EYE REDNESS: 0
BLOOD IN STOOL: 0
GASTROINTESTINAL NEGATIVE: 1
SINUS PAIN: 0
EYES NEGATIVE: 1
DIARRHEA: 0
CHEST TIGHTNESS: 0
SHORTNESS OF BREATH: 0
CONSTIPATION: 0
RHINORRHEA: 0
EYE PAIN: 0
COUGH: 0
ABDOMINAL PAIN: 0
VOMITING: 0
SINUS PRESSURE: 0
BACK PAIN: 0
NAUSEA: 0
RESPIRATORY NEGATIVE: 1

## 2025-03-18 NOTE — PROGRESS NOTES
Heart sounds: Normal heart sounds.   Pulmonary:      Effort: Pulmonary effort is normal.      Breath sounds: Normal breath sounds. No wheezing or rales.   Abdominal:      Palpations: Abdomen is soft.   Musculoskeletal:      Right shoulder: Normal.      Left shoulder: Normal.      Right upper arm: Normal.      Left upper arm: Normal.      Right elbow: Normal.      Left elbow: Normal.      Right forearm: Normal.      Left forearm: Normal.      Right wrist: Normal.      Left wrist: Normal.   Skin:     General: Skin is warm and dry.   Neurological:      Mental Status: He is alert.      Motor: Tremor present.   Psychiatric:         Behavior: Behavior normal.

## 2025-04-10 ENCOUNTER — HOSPITAL ENCOUNTER (EMERGENCY)
Facility: HOSPITAL | Age: 65
Discharge: HOME | End: 2025-04-10
Attending: STUDENT IN AN ORGANIZED HEALTH CARE EDUCATION/TRAINING PROGRAM
Payer: MEDICAID

## 2025-04-10 VITALS
RESPIRATION RATE: 18 BRPM | OXYGEN SATURATION: 97 % | WEIGHT: 150 LBS | SYSTOLIC BLOOD PRESSURE: 135 MMHG | TEMPERATURE: 98.3 F | HEIGHT: 66 IN | BODY MASS INDEX: 24.11 KG/M2 | DIASTOLIC BLOOD PRESSURE: 67 MMHG | HEART RATE: 70 BPM

## 2025-04-10 DIAGNOSIS — S81.812A LACERATION OF LEFT LOWER EXTREMITY, INITIAL ENCOUNTER: Primary | ICD-10-CM

## 2025-04-10 PROCEDURE — 12002 RPR S/N/AX/GEN/TRNK2.6-7.5CM: CPT

## 2025-04-10 PROCEDURE — 99283 EMERGENCY DEPT VISIT LOW MDM: CPT

## 2025-04-10 RX ORDER — CEPHALEXIN 500 MG/1
500 CAPSULE ORAL 3 TIMES DAILY
Qty: 21 CAPSULE | Refills: 0 | Status: SHIPPED | OUTPATIENT
Start: 2025-04-10 | End: 2025-04-17

## 2025-04-10 RX ORDER — ACETAMINOPHEN 500 MG
1000 TABLET ORAL
Status: DISCONTINUED | OUTPATIENT
Start: 2025-04-10 | End: 2025-04-10 | Stop reason: HOSPADM

## 2025-04-10 ASSESSMENT — PAIN - FUNCTIONAL ASSESSMENT: PAIN_FUNCTIONAL_ASSESSMENT: NONE - DENIES PAIN

## 2025-04-10 NOTE — DISCHARGE INSTRUCTIONS
Take the prescribed antibiotic to prevent infection.  Take Tylenol as needed for pain.  Return to the ER for bleeding, drainage, fevers or any other concerns.  Otherwise follow-up with your primary doctor for reevaluation as soon as possible.

## 2025-04-10 NOTE — ED TRIAGE NOTES
Patient states he was cutting a tree with a chainsaw when it \"kicked back\" at him hitting his left shin. Bleeding is controlled and edges are approximated.

## 2025-04-11 NOTE — ED PROVIDER NOTES
[facility]  EMERGENCY DEPARTMENT ENCOUNTER      Pt Name: Kenneth Nina  MRN: [id number]  Birthdate 1960  Date of evaluation: 4/10/2025  Provider: Harry Ramirez MD    CHIEF COMPLAINT       Chief Complaint   Patient presents with    Laceration       HISTORY OF PRESENT ILLNESS    HPI    64-year-old male presenting for left lower leg laceration.  Was using a chainsaw when it kicked back and struck him in the left leg.  Had significant bleeding which has stopped with direct pressure.  States tetanus is up-to-date.  Denies any other injuries.    Nursing notes reviewed.    REVIEW OF SYSTEMS     Review of Systems  Unless otherwise stated, a complete review of systems was asked of the patient. Pertinent positives are noted in the HPI section.    PAST MEDICAL HISTORY     Past Medical History:   Diagnosis Date    Cirrhosis (HCC)     Hyperlipidemia     Hypertension     Osteoarthritis     Peyronie's disease 2013       SURGICAL HISTORY       Past Surgical History:   Procedure Laterality Date    COLONOSCOPY  2018    PENIS SURGERY  01/30/2025    x 3 due to peyroonies disease.       CURRENT MEDICATIONS       Discharge Medication List as of 4/10/2025  1:20 PM        CONTINUE these medications which have NOT CHANGED    Details   pregabalin (LYRICA) 75 MG capsule Take 1 capsule by mouth 3 times daily for 90 days. Max Daily Amount: 225 mg, Disp-270 capsule, R-0Normal      acetaminophen (TYLENOL) 650 MG extended release tablet Take 1 tablet by mouth in the morning and 1 tablet in the evening., Disp-180 tablet, R-1Normal      aspirin 81 MG EC tablet Take 1 tablet by mouth daily, Disp-90 tablet, R-1Normal      DULoxetine (CYMBALTA) 30 MG extended release capsule Take 1 capsule by mouth 2 times daily, Disp-180 capsule, R-1Normal      simvastatin (ZOCOR) 40 MG tablet Take 1 tablet by mouth nightly, Disp-30 tablet, R-2Normal      omeprazole (PRILOSEC) 20 MG delayed release capsule Take 1 capsule by

## 2025-05-01 ENCOUNTER — HOSPITAL ENCOUNTER (EMERGENCY)
Facility: HOSPITAL | Age: 65
Discharge: HOME | End: 2025-05-01
Attending: EMERGENCY MEDICINE
Payer: MEDICAID

## 2025-05-01 VITALS
OXYGEN SATURATION: 97 % | DIASTOLIC BLOOD PRESSURE: 124 MMHG | RESPIRATION RATE: 18 BRPM | SYSTOLIC BLOOD PRESSURE: 149 MMHG | HEART RATE: 56 BPM | HEIGHT: 66 IN | TEMPERATURE: 98 F | BODY MASS INDEX: 24.73 KG/M2 | WEIGHT: 153.88 LBS

## 2025-05-01 DIAGNOSIS — L03.90 CELLULITIS, UNSPECIFIED CELLULITIS SITE: Primary | ICD-10-CM

## 2025-05-01 PROCEDURE — 99283 EMERGENCY DEPT VISIT LOW MDM: CPT

## 2025-05-01 RX ORDER — DOXYCYCLINE HYCLATE 100 MG
100 TABLET ORAL 2 TIMES DAILY
Qty: 14 TABLET | Refills: 0 | Status: SHIPPED | OUTPATIENT
Start: 2025-05-01 | End: 2025-05-08

## 2025-05-01 ASSESSMENT — PAIN DESCRIPTION - FREQUENCY: FREQUENCY: CONTINUOUS

## 2025-05-01 ASSESSMENT — PAIN DESCRIPTION - PAIN TYPE: TYPE: ACUTE PAIN

## 2025-05-01 ASSESSMENT — PAIN DESCRIPTION - DESCRIPTORS: DESCRIPTORS: SORE

## 2025-05-01 ASSESSMENT — PAIN DESCRIPTION - ORIENTATION: ORIENTATION: LEFT

## 2025-05-01 ASSESSMENT — PAIN SCALES - GENERAL: PAINLEVEL_OUTOF10: 3

## 2025-05-01 ASSESSMENT — PAIN DESCRIPTION - LOCATION: LOCATION: LEG

## 2025-05-01 ASSESSMENT — PAIN DESCRIPTION - ONSET: ONSET: ON-GOING

## 2025-05-01 ASSESSMENT — PAIN - FUNCTIONAL ASSESSMENT: PAIN_FUNCTIONAL_ASSESSMENT: ACTIVITIES ARE NOT PREVENTED

## 2025-05-01 NOTE — ED PROVIDER NOTES
[place] [facility]  EMERGENCY DEPARTMENT ENCOUNTER      Pt Name: Kenneth Nina  MRN: [id number]  Birthdate 1960  Date of evaluation: 5/1/2025  Provider: Orly Almendarez DO    CHIEF COMPLAINT       Chief Complaint   Patient presents with    Wound Check         HISTORY OF PRESENT ILLNESS   (Location/Symptom, Timing/Onset, Context/Setting, Quality, Duration, Modifying Factors, Severity)  Note limiting factors.   HPI      Review of External Medical Records:     Nursing Notes were reviewed.    REVIEW OF SYSTEMS    (2-9 systems for level 4, 10 or more for level 5)     Review of Systems    Except as noted above the remainder of the review of systems was reviewed and negative.       PAST MEDICAL HISTORY     Past Medical History:   Diagnosis Date    Cirrhosis (HCC)     Hyperlipidemia     Hypertension     Osteoarthritis     Peyronie's disease 2013         SURGICAL HISTORY       Past Surgical History:   Procedure Laterality Date    COLONOSCOPY  2018    PENIS SURGERY  01/30/2025    x 3 due to peyroonies disease.         CURRENT MEDICATIONS       Previous Medications    ACETAMINOPHEN (TYLENOL) 650 MG EXTENDED RELEASE TABLET    Take 1 tablet by mouth in the morning and 1 tablet in the evening.    ASPIRIN 81 MG EC TABLET    Take 1 tablet by mouth daily    DULOXETINE (CYMBALTA) 30 MG EXTENDED RELEASE CAPSULE    Take 1 capsule by mouth 2 times daily    FERROUS SULFATE (IRON) 325 (65 FE) MG TABS    Take 325 mg by mouth daily    FUROSEMIDE (LASIX) 20 MG TABLET    Take 1 tablet by mouth 2 times daily    LISINOPRIL (PRINIVIL;ZESTRIL) 5 MG TABLET    Take 0.5 tablets by mouth daily    OMEPRAZOLE (PRILOSEC) 20 MG DELAYED RELEASE CAPSULE    Take 1 capsule by mouth daily    PREGABALIN (LYRICA) 75 MG CAPSULE    Take 1 capsule by mouth 3 times daily for 90 days. Max Daily Amount: 225 mg    PRIMIDONE (MYSOLINE) 50 MG TABLET    2 tabs po bid    SIMVASTATIN (ZOCOR) 40 MG TABLET    Take 1 tablet by mouth nightly     Making  64-year-old male presents emergency department chief complaint of wound on his left leg.  He had been scratched by a chainsaw and other debris the beginning of April and been placed on Keflex 3 times daily for several days.  Patient states that his wound had mainly closed but now it started to reopen.  He noted yellow drainage on the bandage.  Site appears more consistent with continued healing via secondary intention as well as some irritation from bandage.  Do not see any exudate at this time but he does have some mild surrounding cellulitic changes.  Will place him on antibiotics.  Advised him that doxycycline can make him more sensitive to the sun.    Risk  Prescription drug management.            REASSESSMENT            CONSULTS:  None    PROCEDURES:  Unless otherwise noted below, none     Procedures      FINAL IMPRESSION      1. Cellulitis, unspecified cellulitis site          DISPOSITION/PLAN   DISPOSITION Decision To Discharge 05/01/2025 10:30:03 AM      PATIENT REFERRED TO:  Nadia Graves, APRN - CNP  [address] [id number] [phone]            DISCHARGE MEDICATIONS:  New Prescriptions    DOXYCYCLINE HYCLATE (VIBRA-TABS) 100 MG TABLET    Take 1 tablet by mouth 2 times daily for 7 days         (Please note that portions of this note were completed with a voice recognition program.  Efforts were made to edit the dictations but occasionally words are mis-transcribed.)    Orly Almendarez DO (electronically signed)  Emergency Attending Physician / Physician Assistant / Nurse Practitioner             Orly Almendarez DO  05/01/25 [id number]

## 2025-05-01 NOTE — ED TRIAGE NOTES
Pt presents with a L leg laceration that occurred in the beginning of April from a chainsaw accident. Pt states wound on leg was mostly closed but not is opening and swelling and draining pus. Pt states his leg is swollen compared to his other leg. Denies fevers. Denies hx of diabetes.

## 2025-05-06 ENCOUNTER — OFFICE VISIT (OUTPATIENT)
Facility: CLINIC | Age: 65
End: 2025-05-06
Payer: MEDICAID

## 2025-05-06 VITALS
HEIGHT: 66 IN | BODY MASS INDEX: 24.78 KG/M2 | HEART RATE: 68 BPM | DIASTOLIC BLOOD PRESSURE: 64 MMHG | RESPIRATION RATE: 16 BRPM | TEMPERATURE: 97.7 F | WEIGHT: 154.2 LBS | SYSTOLIC BLOOD PRESSURE: 132 MMHG | OXYGEN SATURATION: 99 %

## 2025-05-06 DIAGNOSIS — Z13.1 SCREENING FOR DIABETES MELLITUS: ICD-10-CM

## 2025-05-06 DIAGNOSIS — L03.116 CELLULITIS OF LEFT LOWER EXTREMITY: Primary | ICD-10-CM

## 2025-05-06 DIAGNOSIS — E78.2 MIXED HYPERLIPIDEMIA: ICD-10-CM

## 2025-05-06 DIAGNOSIS — I10 PRIMARY HYPERTENSION: ICD-10-CM

## 2025-05-06 DIAGNOSIS — E55.9 VITAMIN D DEFICIENCY: ICD-10-CM

## 2025-05-06 DIAGNOSIS — Z12.5 SCREENING FOR PROSTATE CANCER: ICD-10-CM

## 2025-05-06 PROCEDURE — 99214 OFFICE O/P EST MOD 30 MIN: CPT | Performed by: NURSE PRACTITIONER

## 2025-05-06 PROCEDURE — 3078F DIAST BP <80 MM HG: CPT | Performed by: NURSE PRACTITIONER

## 2025-05-06 PROCEDURE — 3075F SYST BP GE 130 - 139MM HG: CPT | Performed by: NURSE PRACTITIONER

## 2025-05-06 ASSESSMENT — ENCOUNTER SYMPTOMS
EYE PAIN: 0
NAUSEA: 0
SHORTNESS OF BREATH: 0
COUGH: 0
ABDOMINAL PAIN: 0
CONSTIPATION: 0
SINUS PAIN: 0
BACK PAIN: 0
DIARRHEA: 0
EYE REDNESS: 0
VOMITING: 0
RESPIRATORY NEGATIVE: 1
EYES NEGATIVE: 1
BLOOD IN STOOL: 0
RHINORRHEA: 0
GASTROINTESTINAL NEGATIVE: 1
COLOR CHANGE: 1
SINUS PRESSURE: 0
CHEST TIGHTNESS: 0

## 2025-05-06 NOTE — PROGRESS NOTES
Assessment and Plan     1. Cellulitis of left lower extremity: Improving, continue Doxycycline until completed. Recommended to keep area dry and clear. Return instructions given.   2. Primary hypertension: At goal. Continue with Lisinopril   -     CBC; Future  -     Comprehensive Metabolic Panel; Future  -     TSH; Future  3. Mixed hyperlipidemia: Continue with Simvastatin 40 mg daily, will check lipids   -     Lipid Panel; Future  -     Comprehensive Metabolic Panel; Future  4. Vitamin D deficiency: Does not take vitamin D supplements. Will check vitamin D level   -     Vitamin D 25 Hydroxy; Future  5. Screening for diabetes mellitus  -     Comprehensive Metabolic Panel; Future  -     Hemoglobin A1C; Future  6. Screening for prostate cancer  -     PSA Screening; Future     BP Readings from Last 3 Encounters:   05/06/25 132/64   05/01/25 (!) 149/124   04/10/25 135/67     Benefits, risks, possible drug interactions, and side effects of all new medications were reviewed with the patient. Pt verbalized understanding.      An electronic signature was used to authenticate this note.  Nadia Hendrix, APRN - CNP  5/6/2025      Follow-up and Dispositions    Return in about 4 weeks (around 6/3/2025) for physical.          History of Present Illness   Chief Complaint     Kenneth Nina is a 64 y.o. male here for had concerns including Follow-up (ER follow up for cellulitis on left leg. Patient was assisted at [facility] on 05/01/25).   Mr. Nina presents today for ER visit follow up. Pt presents to [facility] due redness to L lower leg and wound after abrasion with chainsaw. Pt is on Doxycycline 100 mg BID with compliance. Reports erythema has decreased significantly. Up to date on Tdap vaccines. Denies fever, chills, discharge from wound.       Review of Systems  Review of Systems   Constitutional: Negative.  Negative for chills, fatigue, fever and unexpected weight change.   HENT:

## 2025-05-09 DIAGNOSIS — E78.2 MIXED HYPERLIPIDEMIA: ICD-10-CM

## 2025-05-09 DIAGNOSIS — Z13.1 SCREENING FOR DIABETES MELLITUS: ICD-10-CM

## 2025-05-09 DIAGNOSIS — Z12.5 SCREENING FOR PROSTATE CANCER: ICD-10-CM

## 2025-05-09 DIAGNOSIS — E55.9 VITAMIN D DEFICIENCY: ICD-10-CM

## 2025-05-09 DIAGNOSIS — I10 PRIMARY HYPERTENSION: ICD-10-CM

## 2025-05-11 ENCOUNTER — TELEPHONE (OUTPATIENT)
Facility: CLINIC | Age: 65
End: 2025-05-11

## 2025-05-11 DIAGNOSIS — I10 PRIMARY HYPERTENSION: ICD-10-CM

## 2025-05-11 LAB
25(OH)D3 SERPL-MCNC: 26.1 NG/ML (ref 30–100)
ALBUMIN SERPL-MCNC: 3.9 G/DL (ref 3.5–5)
ALBUMIN/GLOB SERPL: 1.1 (ref 1.1–2.2)
ALP SERPL-CCNC: 72 U/L (ref 45–117)
ALT SERPL-CCNC: 19 U/L (ref 12–78)
ANION GAP SERPL CALC-SCNC: 5 MMOL/L (ref 2–12)
AST SERPL-CCNC: 23 U/L (ref 15–37)
BILIRUB SERPL-MCNC: 0.4 MG/DL (ref 0.2–1)
BUN SERPL-MCNC: 22 MG/DL (ref 6–20)
BUN/CREAT SERPL: 17 (ref 12–20)
CALCIUM SERPL-MCNC: 9.4 MG/DL (ref 8.5–10.1)
CHLORIDE SERPL-SCNC: 108 MMOL/L (ref 97–108)
CHOLEST SERPL-MCNC: 153 MG/DL
CO2 SERPL-SCNC: 28 MMOL/L (ref 21–32)
CREAT SERPL-MCNC: 1.26 MG/DL (ref 0.7–1.3)
ERYTHROCYTE [DISTWIDTH] IN BLOOD BY AUTOMATED COUNT: 12.6 % (ref 11.5–14.5)
EST. AVERAGE GLUCOSE BLD GHB EST-MCNC: 103 MG/DL
GLOBULIN SER CALC-MCNC: 3.4 G/DL (ref 2–4)
GLUCOSE SERPL-MCNC: 101 MG/DL (ref 65–100)
HBA1C MFR BLD: 5.2 % (ref 4–5.6)
HCT VFR BLD AUTO: 41.7 % (ref 36.6–50.3)
HDLC SERPL-MCNC: 54 MG/DL
HDLC SERPL: 2.8 (ref 0–5)
HGB BLD-MCNC: 13.8 G/DL (ref 12.1–17)
LDLC SERPL CALC-MCNC: 85 MG/DL (ref 0–100)
MCH RBC QN AUTO: 29.7 PG (ref 26–34)
MCHC RBC AUTO-ENTMCNC: 33.1 G/DL (ref 30–36.5)
MCV RBC AUTO: 89.7 FL (ref 80–99)
NRBC # BLD: 0 K/UL (ref 0–0.01)
NRBC BLD-RTO: 0 PER 100 WBC
PLATELET # BLD AUTO: 171 K/UL (ref 150–400)
PMV BLD AUTO: 12.7 FL (ref 8.9–12.9)
POTASSIUM SERPL-SCNC: 4.4 MMOL/L (ref 3.5–5.1)
PROT SERPL-MCNC: 7.3 G/DL (ref 6.4–8.2)
PSA SERPL-MCNC: 0.7 NG/ML (ref 0.01–4)
RBC # BLD AUTO: 4.65 M/UL (ref 4.1–5.7)
SODIUM SERPL-SCNC: 141 MMOL/L (ref 136–145)
TRIGL SERPL-MCNC: 70 MG/DL
TSH SERPL DL<=0.05 MIU/L-ACNC: 1.03 UIU/ML (ref 0.36–3.74)
VLDLC SERPL CALC-MCNC: 14 MG/DL
WBC # BLD AUTO: 5.9 K/UL (ref 4.1–11.1)

## 2025-05-11 RX ORDER — LISINOPRIL 5 MG/1
2.5 TABLET ORAL DAILY
Qty: 15 TABLET | Refills: 0 | Status: SHIPPED | OUTPATIENT
Start: 2025-05-11 | End: 2025-06-20

## 2025-05-12 ENCOUNTER — RESULTS FOLLOW-UP (OUTPATIENT)
Facility: CLINIC | Age: 65
End: 2025-05-12

## 2025-05-12 NOTE — TELEPHONE ENCOUNTER
FYI: PSR attempted to reach out to patient to schedule CPE in June - no answer, left detailed VM for call back

## 2025-05-19 DIAGNOSIS — E78.2 MIXED HYPERLIPIDEMIA: ICD-10-CM

## 2025-05-20 RX ORDER — SIMVASTATIN 40 MG
40 TABLET ORAL NIGHTLY
Qty: 30 TABLET | Refills: 2 | Status: SHIPPED | OUTPATIENT
Start: 2025-05-20

## 2025-06-20 DIAGNOSIS — I10 PRIMARY HYPERTENSION: ICD-10-CM

## 2025-06-20 RX ORDER — FUROSEMIDE 20 MG/1
20 TABLET ORAL 2 TIMES DAILY
Qty: 180 TABLET | Refills: 0 | Status: SHIPPED | OUTPATIENT
Start: 2025-06-20 | End: 2025-09-18

## 2025-06-20 RX ORDER — LISINOPRIL 5 MG/1
TABLET ORAL
Qty: 15 TABLET | Refills: 0 | Status: SHIPPED | OUTPATIENT
Start: 2025-06-20

## 2025-06-30 ENCOUNTER — OFFICE VISIT (OUTPATIENT)
Facility: CLINIC | Age: 65
End: 2025-06-30
Payer: MEDICAID

## 2025-06-30 VITALS
TEMPERATURE: 97.7 F | SYSTOLIC BLOOD PRESSURE: 132 MMHG | OXYGEN SATURATION: 100 % | HEART RATE: 59 BPM | DIASTOLIC BLOOD PRESSURE: 70 MMHG | RESPIRATION RATE: 16 BRPM | BODY MASS INDEX: 24.98 KG/M2 | HEIGHT: 66 IN | WEIGHT: 155.4 LBS

## 2025-06-30 DIAGNOSIS — M15.9 OSTEOARTHRITIS OF MULTIPLE JOINTS, UNSPECIFIED OSTEOARTHRITIS TYPE: ICD-10-CM

## 2025-06-30 DIAGNOSIS — Z82.61 FAMILY HISTORY OF RHEUMATOID ARTHRITIS: ICD-10-CM

## 2025-06-30 DIAGNOSIS — I10 PRIMARY HYPERTENSION: ICD-10-CM

## 2025-06-30 DIAGNOSIS — E55.9 VITAMIN D DEFICIENCY: ICD-10-CM

## 2025-06-30 DIAGNOSIS — F32.9 REACTIVE DEPRESSION: ICD-10-CM

## 2025-06-30 DIAGNOSIS — Z00.00 ANNUAL PHYSICAL EXAM: Primary | ICD-10-CM

## 2025-06-30 PROCEDURE — 99396 PREV VISIT EST AGE 40-64: CPT | Performed by: NURSE PRACTITIONER

## 2025-06-30 PROCEDURE — 3078F DIAST BP <80 MM HG: CPT | Performed by: NURSE PRACTITIONER

## 2025-06-30 PROCEDURE — 3075F SYST BP GE 130 - 139MM HG: CPT | Performed by: NURSE PRACTITIONER

## 2025-06-30 RX ORDER — GABAPENTIN 300 MG/1
300 CAPSULE ORAL 3 TIMES DAILY
COMMUNITY

## 2025-06-30 RX ORDER — SERTRALINE HYDROCHLORIDE 25 MG/1
25 TABLET, FILM COATED ORAL DAILY
Qty: 90 TABLET | Refills: 1 | Status: SHIPPED | OUTPATIENT
Start: 2025-06-30

## 2025-06-30 RX ORDER — MULTIVIT-MIN/IRON/FOLIC ACID/K 18-600-40
2000 CAPSULE ORAL DAILY
Qty: 90 CAPSULE | Refills: 1 | Status: SHIPPED | OUTPATIENT
Start: 2025-06-30

## 2025-06-30 ASSESSMENT — PATIENT HEALTH QUESTIONNAIRE - PHQ9
4. FEELING TIRED OR HAVING LITTLE ENERGY: MORE THAN HALF THE DAYS
SUM OF ALL RESPONSES TO PHQ QUESTIONS 1-9: 12
10. IF YOU CHECKED OFF ANY PROBLEMS, HOW DIFFICULT HAVE THESE PROBLEMS MADE IT FOR YOU TO DO YOUR WORK, TAKE CARE OF THINGS AT HOME, OR GET ALONG WITH OTHER PEOPLE: VERY DIFFICULT
2. FEELING DOWN, DEPRESSED OR HOPELESS: MORE THAN HALF THE DAYS
3. TROUBLE FALLING OR STAYING ASLEEP: MORE THAN HALF THE DAYS
6. FEELING BAD ABOUT YOURSELF - OR THAT YOU ARE A FAILURE OR HAVE LET YOURSELF OR YOUR FAMILY DOWN: MORE THAN HALF THE DAYS
8. MOVING OR SPEAKING SO SLOWLY THAT OTHER PEOPLE COULD HAVE NOTICED. OR THE OPPOSITE, BEING SO FIGETY OR RESTLESS THAT YOU HAVE BEEN MOVING AROUND A LOT MORE THAN USUAL: NOT AT ALL
9. THOUGHTS THAT YOU WOULD BE BETTER OFF DEAD, OR OF HURTING YOURSELF: NOT AT ALL
7. TROUBLE CONCENTRATING ON THINGS, SUCH AS READING THE NEWSPAPER OR WATCHING TELEVISION: MORE THAN HALF THE DAYS
5. POOR APPETITE OR OVEREATING: NOT AT ALL
1. LITTLE INTEREST OR PLEASURE IN DOING THINGS: MORE THAN HALF THE DAYS
SUM OF ALL RESPONSES TO PHQ QUESTIONS 1-9: 12

## 2025-06-30 NOTE — PROGRESS NOTES
Have you been to the ER, urgent care clinic since your last visit?  Hospitalized since your last visit?   NO    Have you seen or consulted any other health care providers outside our system since your last visit?   YES - When: approximately 3  weeks ago.  Where and Why: [facility] .            
about 43 years ago. He has a 13 pack-year smoking history. He has been exposed to tobacco smoke. He has never used smokeless tobacco. He reports that he does not currently use alcohol. He reports that he does not currently use drugs.    FAMILY HX: family history includes Alcohol Abuse in his brother; Dementia in his mother; Heart Disease in his father; Kidney Cancer in his mother.    Review of Systems - History obtained from the patient  General ROS: negative for - night sweats, weight gain or weight loss  Cardiovascular ROS: no chest pain, dyspnea on exertion, edema    Physical exam  Blood pressure 132/70, pulse 59, temperature 97.7 °F (36.5 °C), temperature source Oral, resp. rate 16, height 1.676 m (5' 6\"), weight 70.5 kg (155 lb 6.4 oz), SpO2 100%.  Wt Readings from Last 3 Encounters:   06/30/25 70.5 kg (155 lb 6.4 oz)   05/06/25 69.9 kg (154 lb 3.2 oz)   05/01/25 69.8 kg (153 lb 14.1 oz)     He appears well, alert and oriented x 3, pleasant and cooperative. Vitals as noted.   No rashes or significant lesions.  Neck supple and free of adenopathy, or masses.  No thyromegaly or carotid bruits. Cranial nerves normal. Lungs are clear to auscultation. Heart sounds are normal with no murmurs, clicks, gallops or rubs. Abdomen is soft, non- tender, with no masses or organomegaly. Extremities, peripheral pulses and reflexes are normal.  . RECTAL/PROSTATE EXAM: Deferred.   Skin is without rashes or suspicious lesions.      Assessment and Plan:    1. Annual physical exam:   Yearly physical, dental and physical exams recommended  Lab work ordered  Recommended vaccines discussed  Colon and prostate cancer screening guidelines discussed  Skin cancer prevention and monitoring discussed  Healthy lifestyle options discussed.     2. Primary hypertension: At goal. Continue with Lisinopril     3. Reactive depression: Emotional support given, crisis plan discussed. Continue with Duloxetine 30 mg BID, will add low dose Sertraline.

## 2025-07-02 LAB
ANA SER QL: NEGATIVE
LYME ANTIBODY: NEGATIVE

## 2025-07-08 LAB
14-3-3 ETA AG SER IA-MCNC: <0.2 NG/ML
CCP IGA+IGG SERPL IA-ACNC: <20 UNITS
RHEUMATOID FACT SERPL-ACNC: <14 UNITS/ML

## 2025-07-21 DIAGNOSIS — I10 PRIMARY HYPERTENSION: ICD-10-CM

## 2025-07-21 RX ORDER — LISINOPRIL 5 MG/1
TABLET ORAL
Qty: 15 TABLET | Refills: 0 | Status: SHIPPED | OUTPATIENT
Start: 2025-07-21

## 2025-08-20 DIAGNOSIS — I10 PRIMARY HYPERTENSION: ICD-10-CM

## 2025-08-20 RX ORDER — LISINOPRIL 5 MG/1
TABLET ORAL
Qty: 15 TABLET | Refills: 0 | Status: SHIPPED | OUTPATIENT
Start: 2025-08-20